# Patient Record
Sex: FEMALE | Race: WHITE | Employment: FULL TIME | URBAN - METROPOLITAN AREA
[De-identification: names, ages, dates, MRNs, and addresses within clinical notes are randomized per-mention and may not be internally consistent; named-entity substitution may affect disease eponyms.]

---

## 2017-12-15 VITALS — BODY MASS INDEX: 28.68 KG/M2 | WEIGHT: 168 LBS | HEIGHT: 64 IN

## 2017-12-15 NOTE — PERIOP NOTES
Patient verified name, , and surgery as listed in [facility]. Type 2 surgery, phone assessment complete. Orders not received. Labs per surgeon: no orders on chart  Labs per anesthesia protocol: hgb needed- Pt instructed to go to outpatient lab at Entrance B for blood draw Mon-Fri [id number] prior to day of surgery. 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 Order placed in EMR on hold for arrival to preop. Patient answered medical/surgical history questions at their best of ability. All prior to admission medications documented in [facility]. Patient instructed to take the following medications the day of surgery according to anesthesia guidelines with a small sip of water: omeprazole. Hold all vitamins 7 days prior to surgery and NSAIDS 5 days prior to surgery. Medications to be held- none. Patient instructed on the following:  Arrive at A Entrance, time of arrival to be called the day before by 1700  NPO after midnight including gum, mints, and ice chips  Responsible adult must drive patient to the hospital, stay during surgery, and patient will need supervision 24 hours after anesthesia  Use antibacterial soap in shower the night before surgery and on the morning of surgery  Leave all valuables (money and jewelry) at home but bring insurance card and ID on DOS  Do not wear make-up, nail polish, lotions, cologne, perfumes, powders, or oil on skin. Patient teach back successful and patient demonstrates knowledge of instruction.

## 2017-12-18 ENCOUNTER — HOSPITAL ENCOUNTER (OUTPATIENT)
Dept: SURGERY | Age: 37
Discharge: HOME | End: 2017-12-18

## 2017-12-21 ENCOUNTER — ANESTHESIA EVENT (OUTPATIENT)
Dept: SURGERY | Age: 37
End: 2017-12-21
Payer: COMMERCIAL

## 2017-12-21 RX ORDER — SODIUM CHLORIDE 0.9 % (FLUSH) 0.9 %
5-10 SYRINGE (ML) INJECTION AS NEEDED
Status: CANCELLED | OUTPATIENT
Start: 2017-12-21

## 2017-12-21 RX ORDER — SODIUM CHLORIDE 0.9 % (FLUSH) 0.9 %
5-10 SYRINGE (ML) INJECTION EVERY 8 HOURS
Status: CANCELLED | OUTPATIENT
Start: 2017-12-21

## 2017-12-21 NOTE — H&P
Subjective:     Patient is a 40 y.o.  female presents with pelvic pain and right ovarian cyst. gradually worsening course. Patient Active Problem List    Diagnosis Date Noted    Gastroesophageal reflux disease without esophagitis 04/23/2015    Hyperhidrosis of palms 10/11/2012     Past Medical History:   Diagnosis Date    Esophageal reflux     Nausea & vomiting     post-op    Ovarian cyst       Past Surgical History:   Procedure Laterality Date    HX APPENDECTOMY  2000    HX ORTHOPAEDIC Right May 2014    hand fracture - surgery after injury    HX OVARIAN CYST REMOVAL  2000    right      [unfilled]  Allergies   Allergen Reactions    Bactrim [Sulfamethoxazole-Trimethoprim] Rash    Cephalexin Other (comments)     Mouth sores    Codeine Nausea and Vomiting    Erythromycin Other (comments)     GI - Zithromax ok      Social History   Substance Use Topics    Smoking status: Never Smoker    Smokeless tobacco: Never Used    Alcohol use No      Family History   Problem Relation Age of Onset    No Known Problems Mother     No Known Problems Father     No Known Problems Brother       Review of Systems  A comprehensive review of systems was negative except for that written in the HPI. Objective:     No data found. No intake or output data in the 24 hours ending 12/21/17 1148      General: Alert . Oriented x3   HEENT: Normocephalic PEERL   Heart: RRR   Lungs: Clear   Abdominal: Bowel sounds are normal, liver is not enlarged, spleen is not enlarged   Neurological: Alert and oriented X 3, normal strength and tone. Normal symmetric reflexes.  Normal coordination and gait   Extremities: extremities normal, atraumatic, no cyanosis or edema     Assessment:     Patient Active Problem List    Diagnosis Date Noted    Gastroesophageal reflux disease without esophagitis 04/23/2015    Hyperhidrosis of palms 10/11/2012       Plan:       Procedure(s):  RIGHT SALPINGO-OOPHORECTOMY LAPAROSCOPIC        The procedure was reviewed in detail as well as the risks of bleeding, infection, DVT and potential surgical complications involving the bladder, ureters, colon or intestines. Also the alternatives,  benefits, recovery and follow-up. All of her questions were answered.

## 2017-12-22 ENCOUNTER — ANESTHESIA (OUTPATIENT)
Dept: SURGERY | Age: 37
End: 2017-12-22
Payer: COMMERCIAL

## 2017-12-22 ENCOUNTER — HOSPITAL ENCOUNTER (OUTPATIENT)
Age: 37
Setting detail: OUTPATIENT SURGERY
Discharge: HOME | End: 2017-12-22
Attending: OBSTETRICS & GYNECOLOGY | Admitting: OBSTETRICS & GYNECOLOGY
Payer: COMMERCIAL

## 2017-12-22 VITALS
WEIGHT: 165.44 LBS | RESPIRATION RATE: 15 BRPM | TEMPERATURE: 98.8 F | HEART RATE: 73 BPM | OXYGEN SATURATION: 97 % | SYSTOLIC BLOOD PRESSURE: 116 MMHG | HEIGHT: 64 IN | BODY MASS INDEX: 28.24 KG/M2 | DIASTOLIC BLOOD PRESSURE: 62 MMHG

## 2017-12-22 DIAGNOSIS — G89.18 POSTOPERATIVE PAIN: Primary | ICD-10-CM

## 2017-12-22 LAB
HCG UR QL: NEGATIVE
HGB BLD-MCNC: 11.1 G/DL (ref 11.7–15.4)

## 2017-12-22 PROCEDURE — 76010000161 HC OR TIME 1 TO 1.5 HR INTENSV-TIER 1: Performed by: OBSTETRICS & GYNECOLOGY

## 2017-12-22 PROCEDURE — 74011250637 HC RX REV CODE- 250/637: Performed by: OBSTETRICS & GYNECOLOGY

## 2017-12-22 PROCEDURE — 77030018836 HC SOL IRR NACL ICUM -A: Performed by: OBSTETRICS & GYNECOLOGY

## 2017-12-22 PROCEDURE — 77030008477 HC STYL SATN SLP COVD -A: Performed by: ANESTHESIOLOGY

## 2017-12-22 PROCEDURE — 77030008756 HC TU IRR SUC STRY -B: Performed by: OBSTETRICS & GYNECOLOGY

## 2017-12-22 PROCEDURE — 74011000250 HC RX REV CODE- 250

## 2017-12-22 PROCEDURE — 77030008703 HC TU ET UNCUF COVD -A: Performed by: ANESTHESIOLOGY

## 2017-12-22 PROCEDURE — 74011000250 HC RX REV CODE- 250: Performed by: OBSTETRICS & GYNECOLOGY

## 2017-12-22 PROCEDURE — 77030008522 HC TBNG INSUF LAPRO STRY -B: Performed by: OBSTETRICS & GYNECOLOGY

## 2017-12-22 PROCEDURE — 76210000020 HC REC RM PH II FIRST 0.5 HR: Performed by: OBSTETRICS & GYNECOLOGY

## 2017-12-22 PROCEDURE — 77030000038 HC TIP SCIS LAPSCP SURI -B: Performed by: OBSTETRICS & GYNECOLOGY

## 2017-12-22 PROCEDURE — 77030020782 HC GWN BAIR PAWS FLX 3M -B: Performed by: ANESTHESIOLOGY

## 2017-12-22 PROCEDURE — 74011000250 HC RX REV CODE- 250: Performed by: ANESTHESIOLOGY

## 2017-12-22 PROCEDURE — 74011250636 HC RX REV CODE- 250/636

## 2017-12-22 PROCEDURE — 77030037892: Performed by: OBSTETRICS & GYNECOLOGY

## 2017-12-22 PROCEDURE — 85018 HEMOGLOBIN: CPT | Performed by: ANESTHESIOLOGY

## 2017-12-22 PROCEDURE — 76210000016 HC OR PH I REC 1 TO 1.5 HR: Performed by: OBSTETRICS & GYNECOLOGY

## 2017-12-22 PROCEDURE — 77030010507 HC ADH SKN DERMBND J&J -B: Performed by: OBSTETRICS & GYNECOLOGY

## 2017-12-22 PROCEDURE — 77030032490 HC SLV COMPR SCD KNE COVD -B: Performed by: OBSTETRICS & GYNECOLOGY

## 2017-12-22 PROCEDURE — 81025 URINE PREGNANCY TEST: CPT

## 2017-12-22 PROCEDURE — 77030011640 HC PAD GRND REM COVD -A: Performed by: OBSTETRICS & GYNECOLOGY

## 2017-12-22 PROCEDURE — 77030031139 HC SUT VCRL2 J&J -A: Performed by: OBSTETRICS & GYNECOLOGY

## 2017-12-22 PROCEDURE — 74011250636 HC RX REV CODE- 250/636: Performed by: ANESTHESIOLOGY

## 2017-12-22 PROCEDURE — 77030035029 HC NDL INSUF VERES DISP COVD -B: Performed by: OBSTETRICS & GYNECOLOGY

## 2017-12-22 PROCEDURE — 77030026093 HC FCPS ENDOSC PLSM OCOA -E: Performed by: OBSTETRICS & GYNECOLOGY

## 2017-12-22 PROCEDURE — 88305 TISSUE EXAM BY PATHOLOGIST: CPT | Performed by: OBSTETRICS & GYNECOLOGY

## 2017-12-22 PROCEDURE — 77030035045 HC TRCR ENDOSC VRSPRT BLDLSS COVD -B: Performed by: OBSTETRICS & GYNECOLOGY

## 2017-12-22 PROCEDURE — 77030035051: Performed by: OBSTETRICS & GYNECOLOGY

## 2017-12-22 PROCEDURE — 76060000033 HC ANESTHESIA 1 TO 1.5 HR: Performed by: OBSTETRICS & GYNECOLOGY

## 2017-12-22 PROCEDURE — 77030035048 HC TRCR ENDOSC OPTCL COVD -B: Performed by: OBSTETRICS & GYNECOLOGY

## 2017-12-22 PROCEDURE — 77030011502 HC MANIP UTER ZUM ZINN -B: Performed by: OBSTETRICS & GYNECOLOGY

## 2017-12-22 RX ORDER — SODIUM CHLORIDE, SODIUM LACTATE, POTASSIUM CHLORIDE, CALCIUM CHLORIDE 600; 310; 30; 20 MG/100ML; MG/100ML; MG/100ML; MG/100ML
50 INJECTION, SOLUTION INTRAVENOUS CONTINUOUS
Status: DISCONTINUED | OUTPATIENT
Start: 2017-12-22 | End: 2017-12-22 | Stop reason: HOSPADM

## 2017-12-22 RX ORDER — OXYCODONE HYDROCHLORIDE 5 MG/1
5 TABLET ORAL
Status: DISCONTINUED | OUTPATIENT
Start: 2017-12-22 | End: 2017-12-22 | Stop reason: HOSPADM

## 2017-12-22 RX ORDER — LIDOCAINE HYDROCHLORIDE 20 MG/ML
INJECTION, SOLUTION EPIDURAL; INFILTRATION; INTRACAUDAL; PERINEURAL AS NEEDED
Status: DISCONTINUED | OUTPATIENT
Start: 2017-12-22 | End: 2017-12-22 | Stop reason: HOSPADM

## 2017-12-22 RX ORDER — CELECOXIB 200 MG/1
200 CAPSULE ORAL
Status: DISCONTINUED | OUTPATIENT
Start: 2017-12-22 | End: 2017-12-22 | Stop reason: HOSPADM

## 2017-12-22 RX ORDER — SODIUM CHLORIDE, SODIUM LACTATE, POTASSIUM CHLORIDE, CALCIUM CHLORIDE 600; 310; 30; 20 MG/100ML; MG/100ML; MG/100ML; MG/100ML
75 INJECTION, SOLUTION INTRAVENOUS CONTINUOUS
Status: DISCONTINUED | OUTPATIENT
Start: 2017-12-22 | End: 2017-12-22 | Stop reason: HOSPADM

## 2017-12-22 RX ORDER — MIDAZOLAM HYDROCHLORIDE 1 MG/ML
2 INJECTION, SOLUTION INTRAMUSCULAR; INTRAVENOUS ONCE
Status: DISCONTINUED | OUTPATIENT
Start: 2017-12-22 | End: 2017-12-22 | Stop reason: HOSPADM

## 2017-12-22 RX ORDER — ONDANSETRON 2 MG/ML
INJECTION INTRAMUSCULAR; INTRAVENOUS AS NEEDED
Status: DISCONTINUED | OUTPATIENT
Start: 2017-12-22 | End: 2017-12-22 | Stop reason: HOSPADM

## 2017-12-22 RX ORDER — GLYCOPYRROLATE 0.2 MG/ML
INJECTION INTRAMUSCULAR; INTRAVENOUS AS NEEDED
Status: DISCONTINUED | OUTPATIENT
Start: 2017-12-22 | End: 2017-12-22 | Stop reason: HOSPADM

## 2017-12-22 RX ORDER — DEXAMETHASONE SODIUM PHOSPHATE 4 MG/ML
INJECTION, SOLUTION INTRA-ARTICULAR; INTRALESIONAL; INTRAMUSCULAR; INTRAVENOUS; SOFT TISSUE AS NEEDED
Status: DISCONTINUED | OUTPATIENT
Start: 2017-12-22 | End: 2017-12-22 | Stop reason: HOSPADM

## 2017-12-22 RX ORDER — FENTANYL CITRATE 50 UG/ML
100 INJECTION, SOLUTION INTRAMUSCULAR; INTRAVENOUS ONCE
Status: DISCONTINUED | OUTPATIENT
Start: 2017-12-22 | End: 2017-12-22 | Stop reason: HOSPADM

## 2017-12-22 RX ORDER — BUPIVACAINE HYDROCHLORIDE 5 MG/ML
INJECTION, SOLUTION EPIDURAL; INTRACAUDAL AS NEEDED
Status: DISCONTINUED | OUTPATIENT
Start: 2017-12-22 | End: 2017-12-22 | Stop reason: HOSPADM

## 2017-12-22 RX ORDER — KETOROLAC TROMETHAMINE 30 MG/ML
INJECTION, SOLUTION INTRAMUSCULAR; INTRAVENOUS AS NEEDED
Status: DISCONTINUED | OUTPATIENT
Start: 2017-12-22 | End: 2017-12-22 | Stop reason: HOSPADM

## 2017-12-22 RX ORDER — OXYCODONE HYDROCHLORIDE 5 MG/1
5 TABLET ORAL
Qty: 30 TAB | Refills: 0 | Status: SHIPPED | OUTPATIENT
Start: 2017-12-22 | End: 2017-12-29

## 2017-12-22 RX ORDER — MIDAZOLAM HYDROCHLORIDE 1 MG/ML
2 INJECTION, SOLUTION INTRAMUSCULAR; INTRAVENOUS
Status: DISCONTINUED | OUTPATIENT
Start: 2017-12-22 | End: 2017-12-22 | Stop reason: HOSPADM

## 2017-12-22 RX ORDER — HYDROMORPHONE HYDROCHLORIDE 2 MG/ML
0.5 INJECTION, SOLUTION INTRAMUSCULAR; INTRAVENOUS; SUBCUTANEOUS
Status: DISCONTINUED | OUTPATIENT
Start: 2017-12-22 | End: 2017-12-22 | Stop reason: HOSPADM

## 2017-12-22 RX ORDER — PROPOFOL 10 MG/ML
INJECTION, EMULSION INTRAVENOUS AS NEEDED
Status: DISCONTINUED | OUTPATIENT
Start: 2017-12-22 | End: 2017-12-22 | Stop reason: HOSPADM

## 2017-12-22 RX ORDER — LIDOCAINE HYDROCHLORIDE 10 MG/ML
0.1 INJECTION INFILTRATION; PERINEURAL AS NEEDED
Status: DISCONTINUED | OUTPATIENT
Start: 2017-12-22 | End: 2017-12-22 | Stop reason: HOSPADM

## 2017-12-22 RX ORDER — SODIUM CHLORIDE 0.9 % (FLUSH) 0.9 %
5-10 SYRINGE (ML) INJECTION AS NEEDED
Status: DISCONTINUED | OUTPATIENT
Start: 2017-12-22 | End: 2017-12-22 | Stop reason: HOSPADM

## 2017-12-22 RX ORDER — SCOLOPAMINE TRANSDERMAL SYSTEM 1 MG/1
1.5 PATCH, EXTENDED RELEASE TRANSDERMAL
Status: DISCONTINUED | OUTPATIENT
Start: 2017-12-22 | End: 2017-12-22 | Stop reason: HOSPADM

## 2017-12-22 RX ORDER — ROCURONIUM BROMIDE 10 MG/ML
INJECTION, SOLUTION INTRAVENOUS AS NEEDED
Status: DISCONTINUED | OUTPATIENT
Start: 2017-12-22 | End: 2017-12-22 | Stop reason: HOSPADM

## 2017-12-22 RX ORDER — ALBUTEROL SULFATE 0.83 MG/ML
2.5 SOLUTION RESPIRATORY (INHALATION) AS NEEDED
Status: DISCONTINUED | OUTPATIENT
Start: 2017-12-22 | End: 2017-12-22 | Stop reason: HOSPADM

## 2017-12-22 RX ORDER — FENTANYL CITRATE 50 UG/ML
INJECTION, SOLUTION INTRAMUSCULAR; INTRAVENOUS AS NEEDED
Status: DISCONTINUED | OUTPATIENT
Start: 2017-12-22 | End: 2017-12-22 | Stop reason: HOSPADM

## 2017-12-22 RX ORDER — NEOSTIGMINE METHYLSULFATE 1 MG/ML
INJECTION INTRAVENOUS AS NEEDED
Status: DISCONTINUED | OUTPATIENT
Start: 2017-12-22 | End: 2017-12-22 | Stop reason: HOSPADM

## 2017-12-22 RX ADMIN — LIDOCAINE HYDROCHLORIDE 60 MG: 20 INJECTION, SOLUTION EPIDURAL; INFILTRATION; INTRACAUDAL; PERINEURAL at 09:50

## 2017-12-22 RX ADMIN — SODIUM CHLORIDE, SODIUM LACTATE, POTASSIUM CHLORIDE, AND CALCIUM CHLORIDE: 600; 310; 30; 20 INJECTION, SOLUTION INTRAVENOUS at 09:37

## 2017-12-22 RX ADMIN — FENTANYL CITRATE 50 MCG: 50 INJECTION, SOLUTION INTRAMUSCULAR; INTRAVENOUS at 11:03

## 2017-12-22 RX ADMIN — FENTANYL CITRATE 50 MCG: 50 INJECTION, SOLUTION INTRAMUSCULAR; INTRAVENOUS at 09:50

## 2017-12-22 RX ADMIN — PROPOFOL 150 MG: 10 INJECTION, EMULSION INTRAVENOUS at 09:50

## 2017-12-22 RX ADMIN — ONDANSETRON 4 MG: 2 INJECTION INTRAMUSCULAR; INTRAVENOUS at 10:40

## 2017-12-22 RX ADMIN — GLYCOPYRROLATE 0.4 MG: 0.2 INJECTION INTRAMUSCULAR; INTRAVENOUS at 10:50

## 2017-12-22 RX ADMIN — KETOROLAC TROMETHAMINE 30 MG: 30 INJECTION, SOLUTION INTRAMUSCULAR; INTRAVENOUS at 10:47

## 2017-12-22 RX ADMIN — LIDOCAINE HYDROCHLORIDE 0.1 ML: 10 INJECTION, SOLUTION INFILTRATION; PERINEURAL at 09:26

## 2017-12-22 RX ADMIN — MIDAZOLAM HYDROCHLORIDE 2 MG: 1 INJECTION, SOLUTION INTRAMUSCULAR; INTRAVENOUS at 09:33

## 2017-12-22 RX ADMIN — SODIUM CHLORIDE, SODIUM LACTATE, POTASSIUM CHLORIDE, AND CALCIUM CHLORIDE 75 ML/HR: 600; 310; 30; 20 INJECTION, SOLUTION INTRAVENOUS at 10:00

## 2017-12-22 RX ADMIN — HYDROMORPHONE HYDROCHLORIDE 0.5 MG: 2 INJECTION, SOLUTION INTRAMUSCULAR; INTRAVENOUS; SUBCUTANEOUS at 11:14

## 2017-12-22 RX ADMIN — NEOSTIGMINE METHYLSULFATE 3 MG: 1 INJECTION INTRAVENOUS at 10:50

## 2017-12-22 RX ADMIN — DEXAMETHASONE SODIUM PHOSPHATE 10 MG: 4 INJECTION, SOLUTION INTRA-ARTICULAR; INTRALESIONAL; INTRAMUSCULAR; INTRAVENOUS; SOFT TISSUE at 10:40

## 2017-12-22 RX ADMIN — ROCURONIUM BROMIDE 40 MG: 10 INJECTION, SOLUTION INTRAVENOUS at 09:51

## 2017-12-22 RX ADMIN — HYDROMORPHONE HYDROCHLORIDE 0.5 MG: 2 INJECTION, SOLUTION INTRAMUSCULAR; INTRAVENOUS; SUBCUTANEOUS at 11:20

## 2017-12-22 NOTE — BRIEF OP NOTE
BRIEF OPERATIVE NOTE    Date of Procedure: 12/22/2017   Preoperative Diagnosis: Pelvic pain in female [R10.2]  Cyst of ovary, unspecified laterality [N83.209]  Postoperative Diagnosis: Pelvic pain in female [R10.2]  Cyst of ovary, unspecified     Procedure(s):  RIGHT SALPINGO-OOPHORECTOMY LAPAROSCOPIC, LYSIS OF ADHESIONS, LEFT OVARIAN CYSTECTOMY  Surgeon(s) and Role:     * Radha Bonilla MD - Primary         Assistant Staff:       Surgical Staff:  Circ-1: Frankie Burgos RN  Scrub Tech-1: Rakel Oconnell  Scrub Tech-2: Danielle Jones  Event Time In   Incision Start 1015   Incision Close 1051     Anesthesia: General   Estimated Blood Loss: 25cc  Specimens:   ID Type Source Tests Collected by Time Destination   1 : RIGHT TUBE AND OVARY Preservative   Radha Bonilla MD 12/22/2017 1048 Pathology      Findings: left ovarian cyst, right adhesions and cyst, adhesions posterior uterus (dense).  endometriosis   Complications: none  Implants: * No implants in log *

## 2017-12-22 NOTE — ANESTHESIA PREPROCEDURE EVALUATION
Anesthetic History     PONV          Review of Systems / Medical History  Patient summary reviewed, nursing notes reviewed and pertinent labs reviewed    Pulmonary  Within defined limits                 Neuro/Psych   Within defined limits           Cardiovascular                  Exercise tolerance: >4 METS     GI/Hepatic/Renal     GERD: well controlled           Endo/Other  Within defined limits           Other Findings            Physical Exam    Airway  Mallampati: II      Mouth opening: Normal     Cardiovascular  Regular rate and rhythm,  S1 and S2 normal,  no murmur, click, rub, or gallop             Dental  No notable dental hx       Pulmonary  Breath sounds clear to auscultation               Abdominal         Other Findings            Anesthetic Plan    ASA: 2  Anesthesia type: general          Induction: Intravenous  Anesthetic plan and risks discussed with: Patient and Mother

## 2017-12-22 NOTE — ANESTHESIA POSTPROCEDURE EVALUATION
Post-Anesthesia Evaluation and Assessment    Patient: Darlene Noe MRN: [id number]  SSN: [id number]    YOB: 1980  Age: 40 y.o. Sex: female       Cardiovascular Function/Vital Signs  Visit Vitals    /69    Pulse (!) 56    Temp 37.1 °C (98.8 °F)    Resp 15    Ht 5' 4\" (1.626 m)    Wt 75 kg (165 lb 7 oz)    SpO2 96%    BMI 28.4 kg/m2       Patient is status post general anesthesia for Procedure(s):  RIGHT SALPINGO-OOPHORECTOMY LAPAROSCOPIC, LYSIS OF ADHESIONS, LEFT OVARIAN CYSTECTOMY. Nausea/Vomiting: None    Postoperative hydration reviewed and adequate. Pain:  Pain Scale 1: Numeric (0 - 10) (12/22/17 1115)  Pain Intensity 1: 6 (12/22/17 1115)   Managed    Neurological Status:   Neuro (WDL): Within Defined Limits (12/22/17 1105)  Neuro  Neurologic State: Drowsy (12/22/17 1115)  Orientation Level: Oriented to person;Oriented to situation (12/22/17 1115)  Speech: Clear (12/22/17 1115)  LUE Motor Response: Purposeful (12/22/17 1115)  LLE Motor Response: Purposeful (12/22/17 1115)  RUE Motor Response: Purposeful (12/22/17 1115)  RLE Motor Response: Purposeful (12/22/17 1115)   At baseline    Mental Status and Level of Consciousness: Arousable    Pulmonary Status:   O2 Device: Room air (12/22/17 1150)   Adequate oxygenation and airway patent    Complications related to anesthesia: None    Post-anesthesia assessment completed.  No concerns    Signed By: Alex Corcoran MD     December 22, 2017

## 2017-12-22 NOTE — DISCHARGE INSTRUCTIONS
INSTRUCTIONS FOLLOWING GYN LAPAROSCOPY    ACTIVITY   Limit activity today; increase activity tomorrow, but no vigorous exercise   Shower only; no tub baths   No douches, tampons or intercourse until your doctor releases you (at least 2 weeks)   May return to work or school as directed by your doctor    DIET   Clear liquids until no nausea or vomiting   Advance to regular diet as tolerated    PAIN   Expect a moderate amount of pain.  Take pain medication as directed by your doctor. If no prescription for pain is sent home with you, take the appropriate dose of your commonly used pain medication.  Call you doctor if pain is NOT relieved by medication.  DO NOT take aspirin or blood thinners until directed by your doctor. DRESSING CARE   Change dressing / band aids as directed by your doctor. FOLLOW PHONE [facility] will be made by nursing staff.  If you have any problems or concerns, call your doctor as needed. CALL YOUR DOCTOR IF   Excessive bleeding that does not stop after holding mild pressure over the area for 15 minutes   You soak a pad in an hour   Temperature of 101°F or above   Green or yellow, smelly drainage or discharge   You are unable to urinate by bedtime   Nausea and vomiting that does not stop by bedtime    AFTER ANESTHESIA   For the next 24 hours: DO NOT Drive, Drink alcoholic beverages, or Make important decisions.  Be aware of dizziness following anesthesia and while taking pain medication.    Plan to stay tonight within 1 hours drive of the hospital.

## 2017-12-22 NOTE — IP AVS SNAPSHOT
[address] [address] 
[phone] Patient: Opal Saleh MRN: [id number] JXA:[birth year] About your hospitalization You were admitted on:  December 22, 2017 You last received care in the:  [facility] PACU You were discharged on:  December 22, 2017 Why you were hospitalized Your primary diagnosis was:  Not on File Things You Need To Do (next 8 weeks) Follow up with Toshia Uribe MD  
  
Phone:  [phone] Where:  [address], [address] [address] Follow up with Yaron Eason MD  
  
Phone:  [phone] Where:  [facility] Friday Dec 29, 2017 POST OP with Toshia Uribe MD at  9:00 AM  
Where:  [facility] ([address] [place] [address]) Discharge Orders None A check michael indicates which time of day the medication should be taken. My Medications TAKE these medications as instructed Instructions Each Dose to Equal  
 Morning Noon Evening Bedtime  
 oxyCODONE IR 5 mg immediate release tablet Commonly known as:  Neil Cardona Your last dose was: Your next dose is: Take 1 Tab by mouth every four (4) hours as needed. Max Daily Amount: 30 mg.  
 5 mg PriLOSEC OTC 20 mg tablet Generic drug:  omeprazole Your last dose was: Your next dose is: Take 20 mg by mouth daily. 20 mg Where to Get Your Medications Information on where to get these meds will be given to you by the nurse or doctor. ! Ask your nurse or doctor about these medications  
  oxyCODONE IR 5 mg immediate release tablet Discharge Instructions INSTRUCTIONS FOLLOWING GYN LAPAROSCOPY 
 
ACTIVITY  Limit activity today; increase activity tomorrow, but no vigorous exercise  Shower only; no tub baths  No douches, tampons or intercourse until your doctor releases you (at least 2 weeks)  May return to work or school as directed by your doctor DIET  Clear liquids until no nausea or vomiting  Advance to regular diet as tolerated PAIN 
 Expect a moderate amount of pain.  Take pain medication as directed by your doctor. If no prescription for pain is sent home with you, take the appropriate dose of your commonly used pain medication.  Call you doctor if pain is NOT relieved by medication.  DO NOT take aspirin or blood thinners until directed by your doctor. DRESSING CARE  Change dressing / band aids as directed by your doctor. FOLLOW PHONE CALLS * Calls will be made by nursing staff.  If you have any problems or concerns, call your doctor as needed. CALL YOUR DOCTOR IF 
 Excessive bleeding that does not stop after holding mild pressure over the area for 15 minutes  You soak a pad in an hour  Temperature of 101°F or above  Green or yellow, smelly drainage or discharge  You are unable to urinate by bedtime  Nausea and vomiting that does not stop by bedtime AFTER ANESTHESIA  For the next 24 hours: DO NOT Drive, Drink alcoholic beverages, or Make important decisions.  Be aware of dizziness following anesthesia and while taking pain medication.  Plan to stay tonight within 1 hours drive of the hospital. 
 
 
 
  
  
  
Introducing [facility] HE[facility]! Dear Shahrzad Mcgarry: 
Thank you for requesting a EasyRun account. Our records indicate that you already have an active EasyRun account. You can access your account anytime at [email or web address] Did you know that you can access your hospital and ER discharge instructions at any time in EasyRun? You can also review all of your test results from your hospital stay or ER visit. Additional Information If you have questions, please visit the Frequently Asked Questions section of the [employer] website at [email or web address] Remember, [employer] is NOT to be used for urgent needs. For medical emergencies, dial 911. Now available from your iPhone and Android! Providers Seen During Your Hospitalization Provider Specialty Primary office phone Lali Charlton MD Obstetrics & Gynecology [phone] Your Primary Care Physician (PCP) Primary Care Physician Office Phone Office Fax Mae Sanchez [phone] You are allergic to the following Allergen Reactions Bactrim (Sulfamethoxazole-Trimethoprim) Rash Cephalexin Other (comments) Mouth sores Codeine Nausea and Vomiting Erythromycin Other (comments) GI - Zithromax ok Recent Documentation Height Weight BMI Smoking Status 1.626 m 75 kg 28.4 kg/m2 Never Smoker Emergency Contacts Name Discharge Info Relation Home Work Mobile Maxi Cotsa  Parent [1] [phone] [address]  Parent [1] [phone] Patient Belongings The following personal items are in your possession at time of discharge: 
  Dental Appliances: None  Visual Aid: Glasses Please provide this summary of care documentation to your next provider. Signatures-by signing, you are acknowledging that this After Visit Summary has been reviewed with you and you have received a copy. Patient Signature:  ____________________________________________________________ Date:  ____________________________________________________________  
  
Kenneth Alcazar Provider Signature:  ____________________________________________________________ Date:  ____________________________________________________________

## 2017-12-29 NOTE — OP NOTES
[address] REPORT    Michelle Serrano  MR#: [id number]  : 1980  ACCOUNT #: [de-identified]   DATE OF SERVICE: 2017    PREOPERATIVE DIAGNOSES  1.  Pelvic pain. 2.  Right ovarian cysts. POSTOPERATIVE DIAGNOSES  1.  Pelvic pain. 2.  Right ovarian cysts. 3.  Pelvic adhesions. 4.  Left ovarian cysts. PROCEDURES PERFORMED:  Right salpingo-oophorectomy laparoscopically, adhesiolysis, enterolysis and left ovarian cystectomy. SURGEON:  Vinod Yost MD    ANESTHESIA:  General.    COMPLICATIONS:  None. ESTIMATED BLOOD LOSS:  50 mL. SPECIMENS:  ___    FINDINGS:  Left ovarian cyst, right adhesions and ovarian cyst, posterior adhesions, powder burn areas consistent with possible endometriosis. DESCRIPTION OF PROCEDURE:  After an adequate level of anesthesia was obtained, the patient was prepped and draped in the usual sterile fashion in the dorsal lithotomy position. A weighted speculum was placed. Anterior aspect of the cervix was grasped with a tenaculum. HUMI catheter inserted. Gloves were changed. Infraumbilical incision was made. Veress needle inserted, insufflating the peritoneal cavity with CO2. A trocar was placed and noted to be within the peritoneal cavity. The right ovary was enlarged with no obvious cyst initially that was noted; after cutting into the cervix, there were noted to be cysts consistent with a teratoma, as seen on the ultrasound. The infundibulum and the uteroovarian ligament were coagulated and cut. The tube was left intact. The ovary was taken in toto. Adhesions were taken down from the posterior cul-de-sac with powder burn consistent with possible endometriosis and some adhesions. There were left ovarian cysts x2. These were noted to be clear fluid, drained and coagulated. More adhesions were taken down in the posterior cul-de-sac area; the area was irrigated free of blood clot and debris.   Hemostasis was noted at all points. The ovary was then removed in an EndoCatch bag. The 5 mm suprapubic trocar was removed. CO2 was released. Infraumbilical trocar was removed. Deep stitch placed infraumbilically. Dermabond on skin. Patient tolerated the procedure well and went to recovery room in good condition. DISPOSITION:  Patient was given the usual postoperative precautions; she was given a prescription for Percocet #20 and to be seen in the office for a followup visit.       MD KING Rizo / ROYCE  D: 12/29/2017 11:35     T: 12/29/2017 15:24  JOB #: [id number]

## 2018-10-05 ENCOUNTER — HOSPITAL ENCOUNTER (OUTPATIENT)
Dept: SURGERY | Age: 38
Discharge: HOME | End: 2018-10-05

## 2018-10-08 VITALS — BODY MASS INDEX: 28.17 KG/M2 | HEIGHT: 64 IN | WEIGHT: 165 LBS

## 2018-10-08 NOTE — PERIOP NOTES
Patient verified name, , and surgery as listed in [facility]. Type 2 surgery    Labs per surgeon: unknown; orders not received. Labs per anesthesia protocol: HGB  EKG: not needed per protocols. Patient informed of GYN class on 10/12/18 at which time labs will be drawn. Patient will also receive all patient education and hibiclens soap to use per hospital policy. Patient instructed to hold all vitamins 7 days prior to surgery and NSAIDS 5 days prior to surgery, patient verbalized understanding. Patient instructed to continue previous medications as prescribed prior to surgery and to take the following medications the day of surgery according to anesthesia guidelines with a small sip of water: Omeprazole. Patient answered medical/surgical history questions at their best of ability. All prior to admission medications documented in [facility].

## 2018-10-12 ENCOUNTER — HOSPITAL ENCOUNTER (OUTPATIENT)
Dept: SURGERY | Age: 38
Discharge: HOME | End: 2018-10-12
Payer: COMMERCIAL

## 2018-10-12 LAB — HGB BLD-MCNC: 11.8 G/DL (ref 11.7–15.4)

## 2018-10-12 PROCEDURE — 36415 COLL VENOUS BLD VENIPUNCTURE: CPT

## 2018-10-12 PROCEDURE — 85018 HEMOGLOBIN: CPT

## 2018-10-12 NOTE — PERIOP NOTES
Lab results within limits per anesthesia protocol; OK for surgery.      Recent Results (from the past 12 hour(s))   HEMOGLOBIN    Collection Time: 10/12/18 11:30 AM   Result Value Ref Range    HGB 11.8 11.7 - 15.4 g/dL

## 2018-10-12 NOTE — PROGRESS NOTES
Patient attended GYN surgery orientation class today. Detailed instruction book regarding GYN surgery was provided at start of class. Class content included pre-operative instructions for surgery in the week prior to and day before surgery. Packet including Hibiclens and instructions on bathing was provided to patient. Detailed information was given regarding arriving at the hospital and instructions for the patient's day of surgery. Discussed recovery from surgery, hospital stay, pain management, and discharge. Reviewed recovery at home including pelvic rest, driving and activity restrictions, issues requiring call to physician etc. Caroline Shearing all questions in detail. Patient voices understanding of all.

## 2018-10-29 ENCOUNTER — ANESTHESIA EVENT (OUTPATIENT)
Dept: SURGERY | Age: 38
End: 2018-10-29
Payer: COMMERCIAL

## 2018-10-30 ENCOUNTER — HOSPITAL ENCOUNTER (OUTPATIENT)
Age: 38
Setting detail: OBSERVATION
Discharge: HOME | End: 2018-10-31
Attending: OBSTETRICS & GYNECOLOGY | Admitting: OBSTETRICS & GYNECOLOGY
Payer: COMMERCIAL

## 2018-10-30 ENCOUNTER — ANESTHESIA (OUTPATIENT)
Dept: SURGERY | Age: 38
End: 2018-10-30
Payer: COMMERCIAL

## 2018-10-30 DIAGNOSIS — R10.2 PELVIC PAIN: Primary | ICD-10-CM

## 2018-10-30 LAB
ABO + RH BLD: NORMAL
BLOOD GROUP ANTIBODIES SERPL: NORMAL
HCG UR QL: NEGATIVE
SPECIMEN EXP DATE BLD: NORMAL

## 2018-10-30 PROCEDURE — 74011250637 HC RX REV CODE- 250/637

## 2018-10-30 PROCEDURE — 77030020703 HC SEAL CANN DISP INTU -B: Performed by: OBSTETRICS & GYNECOLOGY

## 2018-10-30 PROCEDURE — 77030039425 HC BLD LARYNG TRULITE DISP TELE -A: Performed by: ANESTHESIOLOGY

## 2018-10-30 PROCEDURE — 74011250636 HC RX REV CODE- 250/636

## 2018-10-30 PROCEDURE — 77030010517 HC APPL SEAL FLOSEL BAXT -B: Performed by: OBSTETRICS & GYNECOLOGY

## 2018-10-30 PROCEDURE — 88307 TISSUE EXAM BY PATHOLOGIST: CPT

## 2018-10-30 PROCEDURE — 74011000258 HC RX REV CODE- 258: Performed by: OBSTETRICS & GYNECOLOGY

## 2018-10-30 PROCEDURE — 99218 HC RM OBSERVATION: CPT

## 2018-10-30 PROCEDURE — 77030018778 HC MANIP UTER VCAR CNMD -B: Performed by: OBSTETRICS & GYNECOLOGY

## 2018-10-30 PROCEDURE — 77030039266 HC ADH SKN EXOFIN S2SG -A: Performed by: OBSTETRICS & GYNECOLOGY

## 2018-10-30 PROCEDURE — 77030018846 HC SOL IRR STRL H20 ICUM -A: Performed by: OBSTETRICS & GYNECOLOGY

## 2018-10-30 PROCEDURE — 77030010545: Performed by: OBSTETRICS & GYNECOLOGY

## 2018-10-30 PROCEDURE — 81025 URINE PREGNANCY TEST: CPT

## 2018-10-30 PROCEDURE — 77030035029 HC NDL INSUF VERES DISP COVD -B: Performed by: OBSTETRICS & GYNECOLOGY

## 2018-10-30 PROCEDURE — 77030008703 HC TU ET UNCUF COVD -A: Performed by: ANESTHESIOLOGY

## 2018-10-30 PROCEDURE — 77030020782 HC GWN BAIR PAWS FLX 3M -B: Performed by: ANESTHESIOLOGY

## 2018-10-30 PROCEDURE — 74011250636 HC RX REV CODE- 250/636: Performed by: ANESTHESIOLOGY

## 2018-10-30 PROCEDURE — 77030035277 HC OBTRTR BLDELSS DISP INTU -B: Performed by: OBSTETRICS & GYNECOLOGY

## 2018-10-30 PROCEDURE — 74011250636 HC RX REV CODE- 250/636: Performed by: OBSTETRICS & GYNECOLOGY

## 2018-10-30 PROCEDURE — 77030020255 HC SOL INJ LR 1000ML BG: Performed by: OBSTETRICS & GYNECOLOGY

## 2018-10-30 PROCEDURE — 77030008477 HC STYL SATN SLP COVD -A: Performed by: ANESTHESIOLOGY

## 2018-10-30 PROCEDURE — 77030016151 HC PROTCTR LNS DFOG COVD -B: Performed by: OBSTETRICS & GYNECOLOGY

## 2018-10-30 PROCEDURE — 36415 COLL VENOUS BLD VENIPUNCTURE: CPT

## 2018-10-30 PROCEDURE — 76010000876 HC OR TIME 2 TO 2.5HR INTENSV - TIER 2: Performed by: OBSTETRICS & GYNECOLOGY

## 2018-10-30 PROCEDURE — 77030034696 HC CATH URETH FOL 2W BARD -A: Performed by: OBSTETRICS & GYNECOLOGY

## 2018-10-30 PROCEDURE — 86901 BLOOD TYPING SEROLOGIC RH(D): CPT

## 2018-10-30 PROCEDURE — 77030027744 HC PWDR HEMSTAT ARISTA ABSRB 5GM BARD -D: Performed by: OBSTETRICS & GYNECOLOGY

## 2018-10-30 PROCEDURE — 77030031492 HC PRT ACC BLNT AIRSEAL CNMD -B: Performed by: OBSTETRICS & GYNECOLOGY

## 2018-10-30 PROCEDURE — 76060000035 HC ANESTHESIA 2 TO 2.5 HR: Performed by: OBSTETRICS & GYNECOLOGY

## 2018-10-30 PROCEDURE — 77030034744 HC WRMR SCOPE DISP STRL ADLR -A: Performed by: OBSTETRICS & GYNECOLOGY

## 2018-10-30 PROCEDURE — 77030008771 HC TU NG SALEM SUMP -A: Performed by: ANESTHESIOLOGY

## 2018-10-30 PROCEDURE — 77030022704 HC SUT VLOC COVD -B: Performed by: OBSTETRICS & GYNECOLOGY

## 2018-10-30 PROCEDURE — 77030035044 HC TRCR ENDOSC VRSPRT BLDLSS COVD -C: Performed by: OBSTETRICS & GYNECOLOGY

## 2018-10-30 PROCEDURE — 77030008756 HC TU IRR SUC STRY -B: Performed by: OBSTETRICS & GYNECOLOGY

## 2018-10-30 PROCEDURE — 77030031139 HC SUT VCRL2 J&J -A: Performed by: OBSTETRICS & GYNECOLOGY

## 2018-10-30 PROCEDURE — 77030005537 HC CATH URETH BARD -A: Performed by: OBSTETRICS & GYNECOLOGY

## 2018-10-30 PROCEDURE — 77030032490 HC SLV COMPR SCD KNE COVD -B: Performed by: OBSTETRICS & GYNECOLOGY

## 2018-10-30 PROCEDURE — 74011250637 HC RX REV CODE- 250/637: Performed by: OBSTETRICS & GYNECOLOGY

## 2018-10-30 PROCEDURE — 74011000250 HC RX REV CODE- 250

## 2018-10-30 PROCEDURE — 76210000006 HC OR PH I REC 0.5 TO 1 HR: Performed by: OBSTETRICS & GYNECOLOGY

## 2018-10-30 PROCEDURE — 74011000250 HC RX REV CODE- 250: Performed by: OBSTETRICS & GYNECOLOGY

## 2018-10-30 PROCEDURE — 77030014650 HC SEAL MTRX FLOSEL BAXT -C: Performed by: OBSTETRICS & GYNECOLOGY

## 2018-10-30 PROCEDURE — 74011250637 HC RX REV CODE- 250/637: Performed by: ANESTHESIOLOGY

## 2018-10-30 PROCEDURE — 77030019908 HC STETH ESOPH SIMS -A: Performed by: ANESTHESIOLOGY

## 2018-10-30 RX ORDER — FENTANYL CITRATE 50 UG/ML
INJECTION, SOLUTION INTRAMUSCULAR; INTRAVENOUS AS NEEDED
Status: DISCONTINUED | OUTPATIENT
Start: 2018-10-30 | End: 2018-10-30 | Stop reason: HOSPADM

## 2018-10-30 RX ORDER — LORAZEPAM 1 MG/1
1 TABLET ORAL
Status: DISCONTINUED | OUTPATIENT
Start: 2018-10-30 | End: 2018-10-30 | Stop reason: SDUPTHER

## 2018-10-30 RX ORDER — OXYCODONE AND ACETAMINOPHEN 5; 325 MG/1; MG/1
1 TABLET ORAL
Status: DISCONTINUED | OUTPATIENT
Start: 2018-10-30 | End: 2018-10-30 | Stop reason: SDUPTHER

## 2018-10-30 RX ORDER — HYDROMORPHONE HYDROCHLORIDE 2 MG/ML
1 INJECTION, SOLUTION INTRAMUSCULAR; INTRAVENOUS; SUBCUTANEOUS
Status: DISCONTINUED | OUTPATIENT
Start: 2018-10-30 | End: 2018-10-31 | Stop reason: HOSPADM

## 2018-10-30 RX ORDER — DEXAMETHASONE SODIUM PHOSPHATE 4 MG/ML
INJECTION, SOLUTION INTRA-ARTICULAR; INTRALESIONAL; INTRAMUSCULAR; INTRAVENOUS; SOFT TISSUE AS NEEDED
Status: DISCONTINUED | OUTPATIENT
Start: 2018-10-30 | End: 2018-10-30 | Stop reason: HOSPADM

## 2018-10-30 RX ORDER — SODIUM CHLORIDE, SODIUM LACTATE, POTASSIUM CHLORIDE, CALCIUM CHLORIDE 600; 310; 30; 20 MG/100ML; MG/100ML; MG/100ML; MG/100ML
125 INJECTION, SOLUTION INTRAVENOUS CONTINUOUS
Status: DISCONTINUED | OUTPATIENT
Start: 2018-10-30 | End: 2018-10-30

## 2018-10-30 RX ORDER — SODIUM CHLORIDE 0.9 % (FLUSH) 0.9 %
5-10 SYRINGE (ML) INJECTION EVERY 8 HOURS
Status: DISCONTINUED | OUTPATIENT
Start: 2018-10-30 | End: 2018-10-30 | Stop reason: HOSPADM

## 2018-10-30 RX ORDER — HYDROMORPHONE HYDROCHLORIDE 2 MG/ML
0.5 INJECTION, SOLUTION INTRAMUSCULAR; INTRAVENOUS; SUBCUTANEOUS
Status: DISCONTINUED | OUTPATIENT
Start: 2018-10-30 | End: 2018-10-30

## 2018-10-30 RX ORDER — KETOROLAC TROMETHAMINE 30 MG/ML
30 INJECTION, SOLUTION INTRAMUSCULAR; INTRAVENOUS
Status: DISCONTINUED | OUTPATIENT
Start: 2018-10-30 | End: 2018-10-31 | Stop reason: HOSPADM

## 2018-10-30 RX ORDER — OXYCODONE AND ACETAMINOPHEN 5; 325 MG/1; MG/1
1 TABLET ORAL
Status: DISCONTINUED | OUTPATIENT
Start: 2018-10-30 | End: 2018-10-31 | Stop reason: HOSPADM

## 2018-10-30 RX ORDER — DOCUSATE SODIUM 100 MG/1
100 CAPSULE, LIQUID FILLED ORAL 2 TIMES DAILY
Status: DISCONTINUED | OUTPATIENT
Start: 2018-10-30 | End: 2018-10-31 | Stop reason: HOSPADM

## 2018-10-30 RX ORDER — ACETAMINOPHEN 10 MG/ML
INJECTION, SOLUTION INTRAVENOUS AS NEEDED
Status: DISCONTINUED | OUTPATIENT
Start: 2018-10-30 | End: 2018-10-30 | Stop reason: HOSPADM

## 2018-10-30 RX ORDER — CLINDAMYCIN PHOSPHATE 900 MG/50ML
900 INJECTION INTRAVENOUS
Status: COMPLETED | OUTPATIENT
Start: 2018-10-30 | End: 2018-10-30

## 2018-10-30 RX ORDER — LIDOCAINE HYDROCHLORIDE 10 MG/ML
0.1 INJECTION INFILTRATION; PERINEURAL AS NEEDED
Status: DISCONTINUED | OUTPATIENT
Start: 2018-10-30 | End: 2018-10-30 | Stop reason: HOSPADM

## 2018-10-30 RX ORDER — HYDROMORPHONE HYDROCHLORIDE 1 MG/ML
1 INJECTION, SOLUTION INTRAMUSCULAR; INTRAVENOUS; SUBCUTANEOUS
Status: DISCONTINUED | OUTPATIENT
Start: 2018-10-30 | End: 2018-10-30 | Stop reason: SDUPTHER

## 2018-10-30 RX ORDER — ROCURONIUM BROMIDE 10 MG/ML
INJECTION, SOLUTION INTRAVENOUS AS NEEDED
Status: DISCONTINUED | OUTPATIENT
Start: 2018-10-30 | End: 2018-10-30 | Stop reason: HOSPADM

## 2018-10-30 RX ORDER — SODIUM CHLORIDE 0.9 % (FLUSH) 0.9 %
5-10 SYRINGE (ML) INJECTION AS NEEDED
Status: DISCONTINUED | OUTPATIENT
Start: 2018-10-30 | End: 2018-10-31 | Stop reason: HOSPADM

## 2018-10-30 RX ORDER — LIDOCAINE HYDROCHLORIDE 20 MG/ML
INJECTION, SOLUTION EPIDURAL; INFILTRATION; INTRACAUDAL; PERINEURAL AS NEEDED
Status: DISCONTINUED | OUTPATIENT
Start: 2018-10-30 | End: 2018-10-30 | Stop reason: HOSPADM

## 2018-10-30 RX ORDER — DIPHENHYDRAMINE HYDROCHLORIDE 50 MG/ML
12.5 INJECTION, SOLUTION INTRAMUSCULAR; INTRAVENOUS
Status: DISCONTINUED | OUTPATIENT
Start: 2018-10-30 | End: 2018-10-30 | Stop reason: SDUPTHER

## 2018-10-30 RX ORDER — PROPOFOL 10 MG/ML
INJECTION, EMULSION INTRAVENOUS AS NEEDED
Status: DISCONTINUED | OUTPATIENT
Start: 2018-10-30 | End: 2018-10-30 | Stop reason: HOSPADM

## 2018-10-30 RX ORDER — LORAZEPAM 1 MG/1
1 TABLET ORAL
Status: DISCONTINUED | OUTPATIENT
Start: 2018-10-30 | End: 2018-10-31 | Stop reason: HOSPADM

## 2018-10-30 RX ORDER — ONDANSETRON 2 MG/ML
INJECTION INTRAMUSCULAR; INTRAVENOUS AS NEEDED
Status: DISCONTINUED | OUTPATIENT
Start: 2018-10-30 | End: 2018-10-30 | Stop reason: HOSPADM

## 2018-10-30 RX ORDER — MIDAZOLAM HYDROCHLORIDE 1 MG/ML
2 INJECTION, SOLUTION INTRAMUSCULAR; INTRAVENOUS
Status: COMPLETED | OUTPATIENT
Start: 2018-10-30 | End: 2018-10-30

## 2018-10-30 RX ORDER — SODIUM CHLORIDE 0.9 % (FLUSH) 0.9 %
5-10 SYRINGE (ML) INJECTION AS NEEDED
Status: DISCONTINUED | OUTPATIENT
Start: 2018-10-30 | End: 2018-10-30

## 2018-10-30 RX ORDER — SODIUM CHLORIDE, SODIUM LACTATE, POTASSIUM CHLORIDE, CALCIUM CHLORIDE 600; 310; 30; 20 MG/100ML; MG/100ML; MG/100ML; MG/100ML
125 INJECTION, SOLUTION INTRAVENOUS CONTINUOUS
Status: DISCONTINUED | OUTPATIENT
Start: 2018-10-30 | End: 2018-10-30 | Stop reason: HOSPADM

## 2018-10-30 RX ORDER — SODIUM CHLORIDE 0.9 % (FLUSH) 0.9 %
5-10 SYRINGE (ML) INJECTION EVERY 8 HOURS
Status: DISCONTINUED | OUTPATIENT
Start: 2018-10-30 | End: 2018-10-30 | Stop reason: SDUPTHER

## 2018-10-30 RX ORDER — DIPHENHYDRAMINE HYDROCHLORIDE 50 MG/ML
12.5 INJECTION, SOLUTION INTRAMUSCULAR; INTRAVENOUS
Status: DISCONTINUED | OUTPATIENT
Start: 2018-10-30 | End: 2018-10-31 | Stop reason: HOSPADM

## 2018-10-30 RX ORDER — GLYCOPYRROLATE 0.2 MG/ML
INJECTION INTRAMUSCULAR; INTRAVENOUS AS NEEDED
Status: DISCONTINUED | OUTPATIENT
Start: 2018-10-30 | End: 2018-10-30 | Stop reason: HOSPADM

## 2018-10-30 RX ORDER — SODIUM CHLORIDE 0.9 % (FLUSH) 0.9 %
5-10 SYRINGE (ML) INJECTION AS NEEDED
Status: DISCONTINUED | OUTPATIENT
Start: 2018-10-30 | End: 2018-10-30 | Stop reason: HOSPADM

## 2018-10-30 RX ORDER — BISACODYL 5 MG
5 TABLET, DELAYED RELEASE (ENTERIC COATED) ORAL DAILY PRN
Status: DISCONTINUED | OUTPATIENT
Start: 2018-10-30 | End: 2018-10-31 | Stop reason: HOSPADM

## 2018-10-30 RX ORDER — NALOXONE HYDROCHLORIDE 0.4 MG/ML
0.1 INJECTION, SOLUTION INTRAMUSCULAR; INTRAVENOUS; SUBCUTANEOUS AS NEEDED
Status: DISCONTINUED | OUTPATIENT
Start: 2018-10-30 | End: 2018-10-30

## 2018-10-30 RX ORDER — KETOROLAC TROMETHAMINE 30 MG/ML
30 INJECTION, SOLUTION INTRAMUSCULAR; INTRAVENOUS AS NEEDED
Status: DISCONTINUED | OUTPATIENT
Start: 2018-10-30 | End: 2018-10-30

## 2018-10-30 RX ORDER — SODIUM CHLORIDE 0.9 % (FLUSH) 0.9 %
5-10 SYRINGE (ML) INJECTION AS NEEDED
Status: DISCONTINUED | OUTPATIENT
Start: 2018-10-30 | End: 2018-10-30 | Stop reason: SDUPTHER

## 2018-10-30 RX ORDER — OXYCODONE HYDROCHLORIDE 5 MG/1
10 TABLET ORAL
Status: DISCONTINUED | OUTPATIENT
Start: 2018-10-30 | End: 2018-10-30

## 2018-10-30 RX ORDER — ATROPA BELLADONNA AND OPIUM 16.2; 6 MG/1; MG/1
SUPPOSITORY RECTAL AS NEEDED
Status: DISCONTINUED | OUTPATIENT
Start: 2018-10-30 | End: 2018-10-30 | Stop reason: HOSPADM

## 2018-10-30 RX ORDER — GABAPENTIN 300 MG/1
600 CAPSULE ORAL ONCE
Status: COMPLETED | OUTPATIENT
Start: 2018-10-30 | End: 2018-10-30

## 2018-10-30 RX ORDER — SODIUM CHLORIDE 0.9 % (FLUSH) 0.9 %
5-10 SYRINGE (ML) INJECTION EVERY 8 HOURS
Status: DISCONTINUED | OUTPATIENT
Start: 2018-10-30 | End: 2018-10-31 | Stop reason: HOSPADM

## 2018-10-30 RX ORDER — KETOROLAC TROMETHAMINE 30 MG/ML
30 INJECTION, SOLUTION INTRAMUSCULAR; INTRAVENOUS
Status: DISCONTINUED | OUTPATIENT
Start: 2018-10-30 | End: 2018-10-30 | Stop reason: SDUPTHER

## 2018-10-30 RX ORDER — KETOROLAC TROMETHAMINE 30 MG/ML
INJECTION, SOLUTION INTRAMUSCULAR; INTRAVENOUS AS NEEDED
Status: DISCONTINUED | OUTPATIENT
Start: 2018-10-30 | End: 2018-10-30 | Stop reason: HOSPADM

## 2018-10-30 RX ORDER — NEOSTIGMINE METHYLSULFATE 1 MG/ML
INJECTION INTRAVENOUS AS NEEDED
Status: DISCONTINUED | OUTPATIENT
Start: 2018-10-30 | End: 2018-10-30 | Stop reason: HOSPADM

## 2018-10-30 RX ADMIN — OXYCODONE HYDROCHLORIDE AND ACETAMINOPHEN 1 TABLET: 5; 325 TABLET ORAL at 15:00

## 2018-10-30 RX ADMIN — CLINDAMYCIN PHOSPHATE 900 MG: 900 INJECTION, SOLUTION INTRAVENOUS at 10:23

## 2018-10-30 RX ADMIN — ONDANSETRON 4 MG: 2 INJECTION INTRAMUSCULAR; INTRAVENOUS at 11:47

## 2018-10-30 RX ADMIN — FENTANYL CITRATE 25 MCG: 50 INJECTION, SOLUTION INTRAMUSCULAR; INTRAVENOUS at 12:19

## 2018-10-30 RX ADMIN — SODIUM CHLORIDE, SODIUM LACTATE, POTASSIUM CHLORIDE, AND CALCIUM CHLORIDE: 600; 310; 30; 20 INJECTION, SOLUTION INTRAVENOUS at 10:34

## 2018-10-30 RX ADMIN — PROPOFOL 200 MG: 10 INJECTION, EMULSION INTRAVENOUS at 10:19

## 2018-10-30 RX ADMIN — ROCURONIUM BROMIDE 10 MG: 10 INJECTION, SOLUTION INTRAVENOUS at 11:08

## 2018-10-30 RX ADMIN — ROCURONIUM BROMIDE 5 MG: 10 INJECTION, SOLUTION INTRAVENOUS at 11:26

## 2018-10-30 RX ADMIN — DEXAMETHASONE SODIUM PHOSPHATE 10 MG: 4 INJECTION, SOLUTION INTRA-ARTICULAR; INTRALESIONAL; INTRAMUSCULAR; INTRAVENOUS; SOFT TISSUE at 10:25

## 2018-10-30 RX ADMIN — DOCUSATE SODIUM 100 MG: 100 CAPSULE, LIQUID FILLED ORAL at 17:49

## 2018-10-30 RX ADMIN — FENTANYL CITRATE 50 MCG: 50 INJECTION, SOLUTION INTRAMUSCULAR; INTRAVENOUS at 10:59

## 2018-10-30 RX ADMIN — ROCURONIUM BROMIDE 40 MG: 10 INJECTION, SOLUTION INTRAVENOUS at 10:19

## 2018-10-30 RX ADMIN — FENTANYL CITRATE 50 MCG: 50 INJECTION, SOLUTION INTRAMUSCULAR; INTRAVENOUS at 10:19

## 2018-10-30 RX ADMIN — OXYCODONE HYDROCHLORIDE AND ACETAMINOPHEN 1 TABLET: 5; 325 TABLET ORAL at 19:33

## 2018-10-30 RX ADMIN — NEOSTIGMINE METHYLSULFATE 1 MG: 1 INJECTION INTRAVENOUS at 12:03

## 2018-10-30 RX ADMIN — Medication 10 ML: at 14:27

## 2018-10-30 RX ADMIN — FENTANYL CITRATE 25 MCG: 50 INJECTION, SOLUTION INTRAMUSCULAR; INTRAVENOUS at 12:13

## 2018-10-30 RX ADMIN — FENTANYL CITRATE 50 MCG: 50 INJECTION, SOLUTION INTRAMUSCULAR; INTRAVENOUS at 10:31

## 2018-10-30 RX ADMIN — SODIUM CHLORIDE 12.5 MG: 9 INJECTION INTRAMUSCULAR; INTRAVENOUS; SUBCUTANEOUS at 14:24

## 2018-10-30 RX ADMIN — LIDOCAINE HYDROCHLORIDE 0.1 ML: 10 INJECTION, SOLUTION INFILTRATION; PERINEURAL at 08:46

## 2018-10-30 RX ADMIN — KETOROLAC TROMETHAMINE 30 MG: 30 INJECTION, SOLUTION INTRAMUSCULAR; INTRAVENOUS at 11:47

## 2018-10-30 RX ADMIN — ACETAMINOPHEN 1000 MG: 10 INJECTION, SOLUTION INTRAVENOUS at 11:55

## 2018-10-30 RX ADMIN — NEOSTIGMINE METHYLSULFATE 1 MG: 1 INJECTION INTRAVENOUS at 12:00

## 2018-10-30 RX ADMIN — GLYCOPYRROLATE 0.2 MG: 0.2 INJECTION INTRAMUSCULAR; INTRAVENOUS at 12:03

## 2018-10-30 RX ADMIN — FENTANYL CITRATE 100 MCG: 50 INJECTION, SOLUTION INTRAMUSCULAR; INTRAVENOUS at 10:45

## 2018-10-30 RX ADMIN — LIDOCAINE HYDROCHLORIDE 100 MG: 20 INJECTION, SOLUTION EPIDURAL; INFILTRATION; INTRACAUDAL; PERINEURAL at 10:19

## 2018-10-30 RX ADMIN — MIDAZOLAM 1 MG: 1 INJECTION INTRAMUSCULAR; INTRAVENOUS at 09:44

## 2018-10-30 RX ADMIN — GENTAMICIN SULFATE 320 MG: 40 INJECTION, SOLUTION INTRAMUSCULAR; INTRAVENOUS at 08:46

## 2018-10-30 RX ADMIN — SODIUM CHLORIDE, SODIUM LACTATE, POTASSIUM CHLORIDE, AND CALCIUM CHLORIDE 125 ML/HR: 600; 310; 30; 20 INJECTION, SOLUTION INTRAVENOUS at 08:46

## 2018-10-30 RX ADMIN — GLYCOPYRROLATE 0.2 MG: 0.2 INJECTION INTRAMUSCULAR; INTRAVENOUS at 12:00

## 2018-10-30 RX ADMIN — GABAPENTIN 600 MG: 300 CAPSULE ORAL at 08:46

## 2018-10-30 NOTE — OP NOTES
ROBOTICS OPERATIVE REPORT    NAME: Sloan Garcia    DATE OF SURGERY: 10/30/2018    Pre-Op Diagnosis: Pelvic pain in female [R10.2]  Endometriosis [N80.9]  Pelvic adhesions [N73.6]  Cyst of ovary, left [N83.202]    Post-Op Diagnosis: Pelvic pain in female [R10.2]  Endometriosis [N80.9]  Pelvic adhesions [N73.6]  Cyst of ovary, left [N83.202]      Procedure: Procedure(s): HYSTERECTOMY ROBOTIC ASSISTED WITH LEFT SALPINGECTOMY AND OOPHORECTOMY    Surgeon: Willy Saenz MD    Anesthesia: General     Estimated Blood Loss: 125cc    Specimens:   ID Type Source Tests Collected by Time Destination   1 : Uterus; left tube and ovary Fresh Uterus with Fallopian Tube & Ovary  Eloina Holliday MD 10/30/2018 1127 Pathology        Complications: none    DRAINS: Carbajal catheter    FINDINGS: very boggy uterus, Copius adhesions of anterior uterus to abdominal wall adnexal adehsion     DESCRIPTION: After an adequate level of anesthesia was  obtained, the patient was prepped and draped in the usual sterile fashion  in the dorsal lithotomy position. A weighted speculum was placed in the  anterior aspect and the cervix was grasped with a tenaculum. The uterus  was sounded to a depth of 8 cm. The V care was placed and cup was sutured  to the cervix. The infraumbilical incision was made and the Veress  needle inserted insufflating the peritoneal cavity with CO2. The trocar  was placed. Under visualization 4 lateral ports were placed on both  sides. The robot was then attached to the trocars. After instruments were placed under visualization and went to the  console Ad hesions of adnexa were taken down with sharp and blunt dissection without energy. The right and then left infundibulopelvic ligament was coagulated and cut. The right and then left  broad ligament was coagulated and cut. Adhesions were removed from anterior aspect of uterus . The bladder flap was  developed on the right side and furthered on the left. The round ligament was coagulated and cut bilaterally . The  bladder flap was continued to be developed. The uterine vessels were  then skeletonized and coagulated and cut. The cardinal ligaments were   taken down to the Vcare ring  and this was circumscribed anteriorly and  posteriorly. Hemostasis was noted. The uterus, left  Tube  and ovary were  removed. The vaginal cuff was closed with 0 Vicryl in a running fashion  and with securing uterosacral ligaments  on each side and closed midline. The area was irrigated free of blood clot and debris. Hemostasis was  noted at all points. She had slight oozing during procedure with very friable tisse so Floseal was placed. The robot was detached. I again scrubbed, removed   the trocars and closed each of the port sites with pressure dressings  on the skin. Vaginal lacerations noted from manipulator . 3-0 vicryl placed. Kamala used vaginally. The patient tolerated the procedure well and went to the  recovery room in good condition.           Jenn Chand MD A

## 2018-10-30 NOTE — ROUTINE PROCESS
TRANSFER - OUT REPORT:    Verbal report given to mikey jeter(name) on [facility]  being transferred to [facility] surg(unit) for routine post - op       Report consisted of patients Situation, Background, Assessment and   Recommendations(SBAR). Information from the following report(s) SBAR was reviewed with the receiving nurse. Opportunity for questions and clarification was provided.       Patient transported with:   O2 @ 0 liters

## 2018-10-30 NOTE — PROGRESS NOTES
's Pre-op visit requested by patient. Conveyed care and concern for patient and family. Offered prayer as requested for patient, family, and staff.     Pinky Johnson MDiv, BS  Board Certified

## 2018-10-30 NOTE — PROGRESS NOTES
TRANSFER - IN REPORT:    Verbal report received from [address]. RN(name) on [facility]  being received from PACU(unit) for routine progression of care      Report consisted of patients Situation, Background, Assessment and   Recommendations(SBAR). Information from the following report(s) Kardex was reviewed with the receiving nurse. Opportunity for questions and clarification was provided. Assessment completed upon patients arrival to unit and care assumed.

## 2018-10-30 NOTE — ANESTHESIA POSTPROCEDURE EVALUATION
Procedure(s): HYSTERECTOMY ROBOTIC ASSISTED WITH LEFT SALPINGECTOMY AND OOPHORECTOMY. Anesthesia Post Evaluation Multimodal analgesia: multimodal analgesia not used between 6 hours prior to anesthesia start to PACU discharge Patient location during evaluation: PACU Patient participation: complete - patient participated Level of consciousness: awake and alert Pain management: adequate Airway patency: patent Anesthetic complications: no 
Cardiovascular status: hemodynamically stable Respiratory status: acceptable Hydration status: acceptable Visit Vitals /77 (BP 1 Location: Left arm, BP Patient Position: At rest) Pulse 70 Temp 36.6 °C (97.8 °F) Resp 16 Wt 75.3 kg (166 lb) SpO2 98% BMI 28.49 kg/m²

## 2018-10-30 NOTE — H&P
Subjective:     Patient is a 41 y.o.  female presents with pelvic pain with previous laparoscpic dx of endometriosis and right oophorectomy . gradually worsening course. Patient Active Problem List    Diagnosis Date Noted    Gastroesophageal reflux disease without esophagitis 04/23/2015    Hyperhidrosis of palms 10/11/2012     Past Medical History:   Diagnosis Date    Esophageal reflux     Nausea & vomiting     post-op    Ovarian cyst       Past Surgical History:   Procedure Laterality Date    HX APPENDECTOMY  2000    HX GYN  12/2017    RSO, left ovarian cystectomy    HX ORTHOPAEDIC Right May 2014    hand fracture - surgery after injury    HX OVARIAN CYST REMOVAL  2000    right      [unfilled]  Allergies   Allergen Reactions    Bactrim [Sulfamethoxazole-Trimethoprim] Rash    Cephalexin Other (comments)     Mouth sores    Codeine Nausea and Vomiting    Erythromycin Other (comments)     GI - Zithromax ok      Social History     Tobacco Use    Smoking status: Never Smoker    Smokeless tobacco: Never Used   Substance Use Topics    Alcohol use: No      Family History   Problem Relation Age of Onset    No Known Problems Mother     No Known Problems Father     No Known Problems Brother       Review of Systems  A comprehensive review of systems was negative except for that written in the HPI. Objective:     Patient Vitals for the past 8 hrs:   BP Pulse Resp SpO2 Weight   10/30/18 0812 133/64 92 16 98 % 166 lb (75.3 kg)   10/30/18 0756 -- -- -- -- 165 lb (74.8 kg)     No intake or output data in the 24 hours ending 10/30/18 0934      General: Alert . Oriented x3   HEENT: Normocephalic PEERL   Heart: RRR   Lungs: Clear   Abdominal: Bowel sounds are normal, liver is not enlarged, spleen is not enlarged   Neurological: Alert and oriented X 3, normal strength and tone. Normal symmetric reflexes.  Normal coordination and gait   Extremities: extremities normal, atraumatic, no cyanosis or edema Assessment:       Pelvic pain  Endometriosis      Plan:       Procedure(s): HYSTERECTOMY ROBOTIC ASSISTED WITH LEFT SALPINGECTOMY AND OOPHORECTOMY          The procedure was reviewed in detail as well as the risks of bleeding, infection, DVT and potential surgical complications involving the bladder, ureters, colon or intestines. Also the alternatives,  benefits, recovery and follow-up. All of her questions were answered.     Discussed  Probable need for ERT after surgey

## 2018-10-30 NOTE — PROGRESS NOTES
Shift assessment complete. Respirations present. Even and unlabored. No s/s of distress. Zero c/o pain at this time. Call light within reach. Encouraged patient to call for assistance. Patient verbalizes understanding. See Doc Flowsheet for assessment details. Patient resting in bed. 5 puncture sites to abdomen. Abdomen soft. SCDs on bilaterally. 02/2l via nasal cannula. Saline well intact to left hand . Family at bedside. Some nausea noted. Instructions on incentive spirometer given to patient and mother.

## 2018-10-31 VITALS
RESPIRATION RATE: 16 BRPM | DIASTOLIC BLOOD PRESSURE: 79 MMHG | OXYGEN SATURATION: 97 % | WEIGHT: 166 LBS | BODY MASS INDEX: 28.49 KG/M2 | SYSTOLIC BLOOD PRESSURE: 122 MMHG | TEMPERATURE: 97.7 F | HEART RATE: 91 BPM

## 2018-10-31 PROCEDURE — 74011250637 HC RX REV CODE- 250/637: Performed by: OBSTETRICS & GYNECOLOGY

## 2018-10-31 PROCEDURE — 99218 HC RM OBSERVATION: CPT

## 2018-10-31 RX ORDER — OXYCODONE AND ACETAMINOPHEN 5; 325 MG/1; MG/1
1 TABLET ORAL
Qty: 15 TAB | Refills: 0 | Status: SHIPPED | OUTPATIENT
Start: 2018-10-31 | End: 2018-11-07

## 2018-10-31 RX ORDER — ESTRADIOL 1 MG/1
1 TABLET ORAL DAILY
Qty: 30 TAB | Refills: 11 | Status: SHIPPED | OUTPATIENT
Start: 2018-10-31 | End: 2019-06-06

## 2018-10-31 RX ADMIN — OXYCODONE HYDROCHLORIDE AND ACETAMINOPHEN 1 TABLET: 5; 325 TABLET ORAL at 03:28

## 2018-10-31 RX ADMIN — Medication 10 ML: at 05:22

## 2018-10-31 RX ADMIN — OXYCODONE HYDROCHLORIDE AND ACETAMINOPHEN 1 TABLET: 5; 325 TABLET ORAL at 07:53

## 2018-10-31 RX ADMIN — DOCUSATE SODIUM 100 MG: 100 CAPSULE, LIQUID FILLED ORAL at 07:53

## 2018-10-31 NOTE — PROGRESS NOTES
Assessment completed and documented. Lung sounds diminished in bases. Heart sounds regular. Abdomen soft and tender. Not passing gas yet. Bowel sounds active. Old drainage to left upper lap site. Currently resting in bed. Will continue to monitor with hourly rounding.

## 2018-10-31 NOTE — DISCHARGE INSTRUCTIONS
DISCHARGE SUMMARY from Nurse    PATIENT INSTRUCTIONS:    After general anesthesia or intravenous sedation, for 24 hours or while taking prescription Narcotics:  · Limit your activities  · Do not drive and operate hazardous machinery  · Do not make important personal or business decisions  · Do  not drink alcoholic beverages  · If you have not urinated within 8 hours after discharge, please contact your surgeon on call. Report the following to your surgeon:  · Excessive pain, swelling, redness or odor of or around the surgical area  · Temperature over 100.5  · Nausea and vomiting lasting longer than 4 hours or if unable to take medications  · Any signs of decreased circulation or nerve impairment to extremity: change in color, persistent  numbness, tingling, coldness or increase pain  · Any questions    What to do at Home:  Recommended activity: Activity as tolerated, no sex/douching/tampons/tub baths, no heavy lifting, no driving while on pain medicine    If you experience any of the following symptoms uncontrolled pain/nausea/vomiting, fever or other signs of infection, heavy vaginal bleeding, vaginal discharge with foul odor, please follow up with Dr Toan Sewell ASAP. *  Please give a list of your current medications to your Primary Care Provider. *  Please update this list whenever your medications are discontinued, doses are      changed, or new medications (including over-the-counter products) are added. *  Please carry medication information at all times in case of emergency situations. These are general instructions for a healthy lifestyle:    No smoking/ No tobacco products/ Avoid exposure to second hand smoke  Surgeon General's Warning:  Quitting smoking now greatly reduces serious risk to your health.     Obesity, smoking, and sedentary lifestyle greatly increases your risk for illness    A healthy diet, regular physical exercise & weight monitoring are important for maintaining a healthy lifestyle    You may be retaining fluid if you have a history of heart failure or if you experience any of the following symptoms:  Weight gain of 3 pounds or more overnight or 5 pounds in a week, increased swelling in our hands or feet or shortness of breath while lying flat in bed. Please call your doctor as soon as you notice any of these symptoms; do not wait until your next office visit. Recognize signs and symptoms of STROKE:    F-face looks uneven    A-arms unable to move or move unevenly    S-speech slurred or non-existent    T-time-call 911 as soon as signs and symptoms begin-DO NOT go       Back to bed or wait to see if you get better-TIME IS BRAIN. Warning Signs of HEART ATTACK     Call 911 if you have these symptoms:   Chest discomfort. Most heart attacks involve discomfort in the center of the chest that lasts more than a few minutes, or that goes away and comes back. It can feel like uncomfortable pressure, squeezing, fullness, or pain.  Discomfort in other areas of the upper body. Symptoms can include pain or discomfort in one or both arms, the back, neck, jaw, or stomach.  Shortness of breath with or without chest discomfort.  Other signs may include breaking out in a cold sweat, nausea, or lightheadedness. Don't wait more than five minutes to call 911 - MINUTES MATTER! Fast action can save your life. Calling 911 is almost always the fastest way to get lifesaving treatment. Emergency Medical Services staff can begin treatment when they arrive -- up to an hour sooner than if someone gets to the hospital by car. The discharge information has been reviewed with the patient. The patient verbalized understanding. Discharge medications reviewed with the patient and appropriate educational materials and side effects teaching were provided.   ___________________________________________________________________________________________________________________________________ Laparoscopic Hysterectomy: What to Expect at [address] [facility]    A hysterectomy is surgery to take out the uterus. In some cases, the ovaries and fallopian tubes also are taken out at the same time. You can expect to feel better and stronger each day, but you may need pain medicine for a week or two. You may get tired easily or have less energy than usual. The tiredness may last for several weeks after surgery. You will probably notice that your belly is swollen and puffy. This is common. The swelling will take several weeks to go down. You may take about 4 to 6 weeks to fully recover. It is important to avoid lifting while you are recovering so that you can heal.  This care sheet gives you a general idea about how long it will take for you to recover. But each person recovers at a different pace. Follow the steps below to get better as quickly as possible. How can you care for yourself at home? Activity    · Rest when you feel tired.     · Be active. Walking is a good choice.     · Allow the area to heal. Don't move quickly or lift anything heavy until you are feeling better.     · You may shower 24 to 48 hours after surgery, if your doctor okays it. Pat the incision dry. Do not take a bath for the first 2 weeks, or until your doctor tells you it is okay.     · Ask your doctor when it is okay for you to have sex. Diet    · You can eat your normal diet. If your stomach is upset, try bland, low-fat foods like plain rice, broiled chicken, toast, and yogurt.     · If your bowel movements are not regular right after surgery, try to avoid constipation and straining. Drink plenty of water. Your doctor may suggest fiber, a stool softener, or a mild laxative. Medicines    · Your doctor will tell you if and when you can restart your medicines.  He or she will also give you instructions about taking any new medicines.     · If you take blood thinners, such as warfarin (Coumadin), clopidogrel (Plavix), or aspirin, be sure to talk to your doctor. He or she will tell you if and when to start taking those medicines again. Make sure that you understand exactly what your doctor wants you to do.     · Be safe with medicines. Read and follow all instructions on the label. ? If the doctor gave you a prescription medicine for pain, take it as prescribed. ? If you are not taking a prescription pain medicine, ask your doctor if you can take an over-the-counter medicine.     · If your doctor prescribed antibiotics, take them as directed. Do not stop taking them just because you feel better. You need to take the full course of antibiotics. Incision care    · You may have stitches over the cuts (incisions) the doctor made in your belly.     · If you have strips of tape on the cut (incision) the doctor made, leave the tape on for a week or until it falls off.     · Wash the area daily with warm, soapy water, and pat it dry. Don't use hydrogen peroxide or alcohol. They can slow healing.     · You may cover the area with a gauze bandage if it oozes fluid or rubs against clothing.     · Change the bandage every day. Other instructions    · You may have some light vaginal bleeding. Wear sanitary pads if needed. Do not douche or use tampons.     · Don't have sex until the doctor says it is okay. Follow-up care is a key part of your treatment and safety. Be sure to make and go to all appointments, and call your doctor if you are having problems. It's also a good idea to know your test results and keep a list of the medicines you take. When should you call for help? Call 911 anytime you think you may need emergency care.  For example, call if:    · You passed out (lost consciousness).     · You have chest pain, are short of breath, or cough up blood.    Call your doctor now or seek immediate medical care if:    · You have pain that does not get better after you take pain medicine.     · You cannot pass stoolsl or gas.     · You have vaginal discharge that has increased in amount or smells bad.     · You are sick to your stomach or cannot drink fluids.     · You have loose stitches, or your incision comes open.     · Bright red blood has soaked through the bandage over your incision.     · You have signs of infection, such as:  ? Increased pain, swelling, warmth, or redness. ? Red streaks leading from the incision. ? Pus draining from the incision. ? A fever.     · You have bright red vaginal bleeding that soaks one or more pads in an hour, or you have large clots.     · You have signs of a blood clot in your leg (called a deep vein thrombosis), such as:  ? Pain in your calf, back of the knee, thigh, or groin. ? Redness and swelling in your leg or groin.    Watch closely for changes in your health, and be sure to contact your doctor if you have any problems. Where can you learn more? Go to http://patito-twin.info/. Enter Q131 in the search box to learn more about \"Laparoscopic Hysterectomy: What to Expect at Home. \"  Current as of: May 15, 2018  Content Version: 11.8  © 6940-9169 [employer]. Care instructions adapted under license by [employer] (which disclaims liability or warranty for this information). If you have questions about a medical condition or this instruction, always ask your healthcare professional. [address] any warranty or liability for your use of this information.

## 2018-10-31 NOTE — PROGRESS NOTES
PM assessment complete. A/O x4. Respirations present, non-labored. Abdomen soft, tender, 5PS - CDI. Voiding at intervals. Aware to call if needs arise.

## 2018-10-31 NOTE — PROGRESS NOTES
Discharge instructions were reviewed with the patient and family. Opportunity for questions given. Patient verbalized understanding of discharge and follow up instructions. PIV was removed. Prescriptions provided. Patient finishing breakfast, getting dressed and will call when ready to be taken out.

## 2021-08-07 ENCOUNTER — HOSPITAL ENCOUNTER (OUTPATIENT)
Dept: MAMMOGRAPHY | Age: 41
Discharge: HOME | End: 2021-08-07
Attending: OBSTETRICS & GYNECOLOGY
Payer: COMMERCIAL

## 2021-08-07 DIAGNOSIS — Z12.31 VISIT FOR SCREENING MAMMOGRAM: ICD-10-CM

## 2021-08-07 PROCEDURE — 77067 SCR MAMMO BI INCL CAD: CPT

## 2022-03-18 PROBLEM — R10.2 PELVIC PAIN: Status: ACTIVE | Noted: 2018-10-30

## 2022-08-13 ENCOUNTER — HOSPITAL ENCOUNTER (OUTPATIENT)
Dept: MAMMOGRAPHY | Age: 42
Discharge: HOME | End: 2022-08-16
Payer: COMMERCIAL

## 2022-08-13 DIAGNOSIS — Z12.31 VISIT FOR SCREENING MAMMOGRAM: ICD-10-CM

## 2022-08-13 PROCEDURE — 77063 BREAST TOMOSYNTHESIS BI: CPT

## 2023-02-20 ENCOUNTER — OFFICE VISIT (OUTPATIENT)
Dept: GYNECOLOGY | Age: 43
End: 2023-02-20
Payer: COMMERCIAL

## 2023-02-20 VITALS
BODY MASS INDEX: 26.77 KG/M2 | DIASTOLIC BLOOD PRESSURE: 80 MMHG | WEIGHT: 156.8 LBS | HEIGHT: 64 IN | SYSTOLIC BLOOD PRESSURE: 102 MMHG

## 2023-02-20 DIAGNOSIS — R10.2 PELVIC CRAMPING: ICD-10-CM

## 2023-02-20 DIAGNOSIS — N95.2 VAGINAL ATROPHY: ICD-10-CM

## 2023-02-20 DIAGNOSIS — N93.9 VAGINAL BLEEDING: Primary | ICD-10-CM

## 2023-02-20 DIAGNOSIS — Z12.72 VAGINAL SMEAR: ICD-10-CM

## 2023-02-20 LAB
BILIRUBIN, URINE, POC: NEGATIVE
BLOOD URINE, POC: NEGATIVE
GLUCOSE URINE, POC: NEGATIVE
KETONES, URINE, POC: NEGATIVE
LEUKOCYTE ESTERASE, URINE, POC: ABNORMAL
NITRITE, URINE, POC: NEGATIVE
PH, URINE, POC: 6 (ref 4.6–8)
PROTEIN,URINE, POC: NEGATIVE
SPECIFIC GRAVITY, URINE, POC: 1.01 (ref 1–1.03)
URINALYSIS CLARITY, POC: CLEAR
URINALYSIS COLOR, POC: YELLOW
UROBILINOGEN, POC: ABNORMAL

## 2023-02-20 PROCEDURE — 99214 OFFICE O/P EST MOD 30 MIN: CPT | Performed by: OBSTETRICS & GYNECOLOGY

## 2023-02-20 PROCEDURE — 81003 URINALYSIS AUTO W/O SCOPE: CPT | Performed by: OBSTETRICS & GYNECOLOGY

## 2023-02-20 RX ORDER — ESTRADIOL 10 UG/1
10 INSERT VAGINAL
Qty: 36 TABLET | Refills: 4 | Status: SHIPPED | OUTPATIENT
Start: 2023-02-20

## 2023-02-20 RX ORDER — ESTRADIOL 10 UG/1
INSERT VAGINAL
Qty: 36 TABLET | Refills: 4 | Status: SHIPPED | OUTPATIENT
Start: 2023-02-20 | End: 2023-02-20

## 2023-02-20 RX ORDER — IBUPROFEN 800 MG/1
1 TABLET ORAL
COMMUNITY

## 2023-02-20 NOTE — PROGRESS NOTES
LAUREN Luciano is a 40 y.o. female seen for vaginal bleeding. She has had complete hysterectomy and BSO. She had spotting which she described similar to the start of a period for several days. She is not sexually active    Past Medical History, Past Surgical History, Family history, Social History, and Medications were all reviewed with the patient today and updated as necessary. Current Outpatient Medications   Medication Sig    ibuprofen (ADVIL;MOTRIN) 800 MG tablet Take 1 tablet by mouth    Estradiol (VAGIFEM) 10 MCG TABS vaginal tablet Place 1 tablet vaginally three times a week    estradiol (ESTRACE) 1 MG tablet Take 1 mg by mouth daily    hyoscyamine (LEVSIN) 125 MCG/5ML ELIX elixir Take 0.125 mg by mouth    omeprazole (PRILOSEC) 10 MG delayed release capsule Take 10 mg by mouth daily     No current facility-administered medications for this visit.      Allergies   Allergen Reactions    Cephalexin Other (See Comments)     Mouth sores    Erythromycin Other (See Comments)     GI - Zithromax ok    Codeine Nausea And Vomiting    Sulfamethoxazole-Trimethoprim Rash     Past Medical History:   Diagnosis Date    Endometriosis     Esophageal reflux     H/O: hysterectomy 10/30/2018    IBS (irritable bowel syndrome)     Ovarian cyst      Past Surgical History:   Procedure Laterality Date    APPENDECTOMY  2000    COLONOSCOPY  12/11/2019    Normal    GYN  12/2017    RSO, left ovarian cystectomy    HYSTERECTOMY (CERVIX STATUS UNKNOWN)  10/2018    LSO - endometriosis    ORTHOPEDIC SURGERY Right May 2014    hand fracture - surgery after injury    OVARIAN CYST REMOVAL  2000    right     Family History   Problem Relation Age of Onset    No Known Problems Father     No Known Problems Brother     No Known Problems Mother       Social History     Tobacco Use    Smoking status: Never    Smokeless tobacco: Never   Substance Use Topics    Alcohol use: No       Social History     Substance and Sexual Activity Sexual Activity Never    Birth control/protection: Surgical    Comment: Never Been Sexually active     OB History    Para Term  AB Living   0 0 0 0 0 0   SAB IAB Ectopic Molar Multiple Live Births   0 0 0 0 0 0       ROS:    Review of Systems  General: Not Present- Chills, Fever, Fatigue, Insomnia, Hot flashes/Night sweats, Weight gain  Skin: Not Present- Bruising, Change in Wart/Mole, Excessive Sweating, Itching, Nail Changes, New Lesions, Rash, Skin Color Changes and Ulcer. HEENT: Not Present- Headache, Blurred Vision, Double Vision, Glaucoma, Visual Disturbances, Hearing Loss, Ringing in the Ears, Vertigo, Nose Bleed, Bleeding Gums, Hoarseness and Sore Throat. Neck: Not Present- Neck Pain and Neck Swelling. Respiratory: Not Present- Cough, Difficulty Breathing and Difficulty Breathing on Exertion. Breast: Not Present- Breast Mass, Breast Pain, Breast Swelling, Nipple Discharge, Nipple Pain, Recent Breast Size Changes and Skin Changes. Cardiovascular: Not Present- Abnormal Blood Pressure, Chest Pain, Edema, Fainting / Blacking Out, Palpitations, Shortness of Breath and Swelling of Extremities. Gastrointestinal: Not Present- Abdominal Pain, Abdominal Swelling, Bloating, Change in Bowel Habits, Constipation, Diarrhea, Difficulty Swallowing, Gets full quickly at meals, Nausea, Rectal Bleeding and Vomiting. Female Genitourinary: Not Present- Dysmenorrhea, Dyspareunia, Decreased libido, Excessive Menstrual Bleeding, Menstrual Irregularities, Pelvic Pain, Urinary Complaints, Vaginal Discharge, Vaginal itching/burning, Vaginal odor  Musculoskeletal: Not Present- Joint Pain and Muscle Pain. Neurological: Not Present- Dizziness, Fainting, Headaches and Seizures. Psychiatric: Not Present- Anxiety, Depression, Mood changes and Panic Attacks. Endocrine: Not Present- Appetite Changes, Cold Intolerance, Excessive Thirst, Excessive Urination and Heat Intolerance.   Hematology: Not Present- Abnormal Bleeding, Easy Bruising and Enlarged Lymph Nodes. PHYSICAL EXAM:    /80   Ht 5' 4\" (1.626 m)   Wt 156 lb 12.8 oz (71.1 kg)   BMI 26.91 kg/m²     Physical Exam   General   Mental Status - Alert. General Appearance - Cooperative. Abdomen   Inspection: - Inspection Normal.   Palpation/Percussion: Palpation and Percussion of the abdomen reveal - Non Tender, No Rebound tenderness, No Rigidity (guarding), No hepatosplenomegaly, No Palpable abdominal masses and Soft. Auscultation: Auscultation of the abdomen reveals - Bowel sounds normal.     Female Genitourinary     External Genitalia   Vulva: - Normal. Perineum - Normal. Bartholin's Gland - Bilateral - Normal. Clitoris - Normal.   Introitus: Characteristics - Normal.   Urethra: Characteristics - Normal.     Speculum & Bimanual   Vagina: Vaginal Mucosa - Normal.   Vaginal Wall: - Normal.   Vagina - There is an area of irritation at the mid portion of the back of the vagina. This bleeds easily with PAP  Cervix: Characteristics -  Surgically absent  Uterus: Characteristics -Surgically absent  Adnexa: - Normal.   Bladder - Normal.       Lymphatics  No cervical, axillary or groin adenopathy            Medical problems and test results were reviewed with the patient today. ASSESSMENT and PLAN    1. Vaginal bleeding  -     Nuswab Vaginitis (VG)  2. Pelvic cramping  -     AMB POC URINALYSIS DIP STICK AUTO W/O MICRO  3. Vaginal atrophy  -     Estradiol (VAGIFEM) 10 MCG TABS vaginal tablet; Place 1 tablet vaginally three times a week, Disp-36 tablet, R-4Normal  4. Vaginal smear  -     PAP LB, Reflex HPV ASCUS     Vaginal PAP smear. NuSwab. Vagifem. Recheck in 6 weeks.         Time:  I spent  30 minutes in preparing to see patient (including chart review and preparation), obtaining and/or reviewing additional medical history, performing a physical exam and evaluation, documenting clinical information in the electronic health record, independently interpreting results, communicating results to patient, family or caregiver, and/or coordinating care. No follow-ups on file.        Leola Rios MD

## 2023-02-23 LAB
CYTOLOGIST CVX/VAG CYTO: NORMAL
CYTOLOGY CVX/VAG DOC THIN PREP: NORMAL
HPV REFLEX: NORMAL
Lab: NORMAL
PATH REPORT.FINAL DX SPEC: NORMAL
STAT OF ADQ CVX/VAG CYTO-IMP: NORMAL

## 2023-02-24 ENCOUNTER — PATIENT MESSAGE (OUTPATIENT)
Dept: GYNECOLOGY | Age: 43
End: 2023-02-24

## 2023-02-24 RX ORDER — ESTRADIOL 0.1 MG/G
1 CREAM VAGINAL
Qty: 42.5 G | Refills: 3 | Status: SHIPPED | OUTPATIENT
Start: 2023-02-24 | End: 2024-02-24

## 2023-02-24 NOTE — TELEPHONE ENCOUNTER
From: Magdalena Almanza  To: Dr. Pratt Resides: 2/24/2023 5:03 AM EST  Subject: Estradiol 10 MCG Vaginal Insert    Hi! this medicine makes my right hand feel funny. It feels like when your foot goes to sleep that numb tingly feeling. Is there a way I can try something different. Plus it makes me draggy and dizzy.

## 2023-02-26 LAB
A VAGINAE DNA VAG QL NAA+PROBE: NORMAL SCORE
BVAB2 DNA VAG QL NAA+PROBE: NORMAL SCORE
C ALBICANS DNA VAG QL NAA+PROBE: NEGATIVE
C GLABRATA DNA VAG QL NAA+PROBE: NEGATIVE
MEGA1 DNA VAG QL NAA+PROBE: NORMAL SCORE
SPECIMEN SOURCE: NORMAL
T VAGINALIS RRNA SPEC QL NAA+PROBE: NEGATIVE

## 2023-04-06 ENCOUNTER — OFFICE VISIT (OUTPATIENT)
Dept: GYNECOLOGY | Age: 43
End: 2023-04-06
Payer: COMMERCIAL

## 2023-04-06 VITALS
SYSTOLIC BLOOD PRESSURE: 112 MMHG | WEIGHT: 155 LBS | DIASTOLIC BLOOD PRESSURE: 76 MMHG | BODY MASS INDEX: 26.46 KG/M2 | HEIGHT: 64 IN

## 2023-04-06 DIAGNOSIS — N95.2 VAGINAL ATROPHY: Primary | ICD-10-CM

## 2023-04-06 PROCEDURE — 99214 OFFICE O/P EST MOD 30 MIN: CPT | Performed by: OBSTETRICS & GYNECOLOGY

## 2023-04-06 RX ORDER — DICLOFENAC SODIUM 75 MG/1
75 TABLET, DELAYED RELEASE ORAL 2 TIMES DAILY
COMMUNITY
Start: 2023-03-23

## 2023-04-06 RX ORDER — OMEPRAZOLE 20 MG/1
CAPSULE, DELAYED RELEASE ORAL
COMMUNITY
Start: 2023-02-06

## 2023-07-23 ENCOUNTER — PATIENT MESSAGE (OUTPATIENT)
Dept: GYNECOLOGY | Age: 43
End: 2023-07-23

## 2023-07-23 DIAGNOSIS — N95.1 MENOPAUSE SYNDROME: Primary | ICD-10-CM

## 2023-07-24 RX ORDER — ESTRADIOL 1 MG/1
1 TABLET ORAL DAILY
Qty: 90 TABLET | Refills: 3 | Status: SHIPPED | OUTPATIENT
Start: 2023-07-24

## 2023-07-24 NOTE — TELEPHONE ENCOUNTER
From: Melina Almanza  To: Dr. Miles Duet: 7/23/2023 6:10 PM EDT  Subject: Estradiol 1 MG Tablet    Hi! I am low on my pills and don't have any more refills left could you refill my prescription and add some refills I would appreciate it! Thank you!

## 2023-08-19 ENCOUNTER — HOSPITAL ENCOUNTER (OUTPATIENT)
Dept: MAMMOGRAPHY | Age: 43
End: 2023-08-19
Payer: COMMERCIAL

## 2023-08-19 DIAGNOSIS — Z12.31 OTHER SCREENING MAMMOGRAM: ICD-10-CM

## 2023-08-19 PROCEDURE — 77063 BREAST TOMOSYNTHESIS BI: CPT

## 2024-02-27 DIAGNOSIS — N95.2 VAGINAL ATROPHY: Primary | ICD-10-CM

## 2024-02-28 RX ORDER — ESTRADIOL 0.1 MG/G
1 CREAM VAGINAL
Qty: 42.5 G | Refills: 0 | Status: SHIPPED | OUTPATIENT
Start: 2024-02-29 | End: 2025-02-28

## 2024-04-18 NOTE — PROGRESS NOTES
status: Never    Smokeless tobacco: Never   Substance Use Topics    Alcohol use: No       Social History     Substance and Sexual Activity   Sexual Activity Never    Birth control/protection: Surgical    Comment: Never Been Sexually active     OB History    Para Term  AB Living   0 0 0 0 0 0   SAB IAB Ectopic Molar Multiple Live Births   0 0 0 0 0 0       Health Maintenance  Mammogram: 24  Colonoscopy:   Bone Density:    ROS:    Review of Systems  General: Not Present- Chills, Fever, Fatigue, Insomnia, Hot flashes/Night sweats, Weight gain  Skin: Not Present- Bruising, Change in Wart/Mole, Excessive Sweating, Itching, Nail Changes, New Lesions, Rash, Skin Color Changes and Ulcer.  HEENT: Not Present- Headache, Blurred Vision, Double Vision, Glaucoma, Visual Disturbances, Hearing Loss, Ringing in the Ears, Vertigo, Nose Bleed, Bleeding Gums, Hoarseness and Sore Throat.  Neck: Not Present- Neck Pain and Neck Swelling.  Respiratory: Not Present- Cough, Difficulty Breathing and Difficulty Breathing on Exertion.  Breast: Not Present- Breast Mass, Breast Pain, Breast Swelling, Nipple Discharge, Nipple Pain, Recent Breast Size Changes and Skin Changes.  Cardiovascular: Not Present- Abnormal Blood Pressure, Chest Pain, Edema, Fainting / Blacking Out, Palpitations, Shortness of Breath and Swelling of Extremities.  Gastrointestinal: Not Present- Abdominal Pain, Abdominal Swelling, Bloating, Change in Bowel Habits, Constipation, Diarrhea, Difficulty Swallowing, Gets full quickly at meals, Nausea, Rectal Bleeding and Vomiting.  Female Genitourinary: Not Present- Dysmenorrhea, Dyspareunia, Decreased libido, Excessive Menstrual Bleeding, Menstrual Irregularities, Pelvic Pain, Urinary Complaints, Vaginal Discharge, Vaginal itching/burning, Vaginal odor  Musculoskeletal: Not Present- Joint Pain and Muscle Pain.  Neurological: Not Present- Dizziness, Fainting, Headaches and Seizures.  Psychiatric: Not Present-

## 2024-04-23 ENCOUNTER — OFFICE VISIT (OUTPATIENT)
Dept: OBGYN CLINIC | Age: 44
End: 2024-04-23
Payer: COMMERCIAL

## 2024-04-23 VITALS — HEIGHT: 64 IN | WEIGHT: 155 LBS | BODY MASS INDEX: 26.46 KG/M2

## 2024-04-23 DIAGNOSIS — N95.2 VAGINAL ATROPHY: ICD-10-CM

## 2024-04-23 DIAGNOSIS — Z12.31 SCREENING MAMMOGRAM, ENCOUNTER FOR: ICD-10-CM

## 2024-04-23 DIAGNOSIS — N95.1 MENOPAUSE SYNDROME: Primary | ICD-10-CM

## 2024-04-23 PROCEDURE — 99214 OFFICE O/P EST MOD 30 MIN: CPT | Performed by: OBSTETRICS & GYNECOLOGY

## 2024-04-23 RX ORDER — ESTRADIOL 0.1 MG/G
CREAM VAGINAL
Qty: 42.5 G | Refills: 5 | Status: SHIPPED | OUTPATIENT
Start: 2024-04-25

## 2024-04-23 RX ORDER — ESTRADIOL 1 MG/1
1 TABLET ORAL DAILY
Qty: 90 TABLET | Refills: 4 | Status: SHIPPED | OUTPATIENT
Start: 2024-04-23

## 2024-08-24 ENCOUNTER — HOSPITAL ENCOUNTER (OUTPATIENT)
Dept: MAMMOGRAPHY | Age: 44
End: 2024-08-24
Attending: OBSTETRICS & GYNECOLOGY
Payer: COMMERCIAL

## 2024-08-24 VITALS — HEIGHT: 64 IN | BODY MASS INDEX: 26.46 KG/M2 | WEIGHT: 155 LBS

## 2024-08-24 DIAGNOSIS — Z12.31 SCREENING MAMMOGRAM, ENCOUNTER FOR: ICD-10-CM

## 2024-08-24 PROCEDURE — 77063 BREAST TOMOSYNTHESIS BI: CPT

## 2025-07-20 ENCOUNTER — PATIENT MESSAGE (OUTPATIENT)
Dept: OBGYN CLINIC | Age: 45
End: 2025-07-20

## 2025-07-20 DIAGNOSIS — N95.1 MENOPAUSE SYNDROME: ICD-10-CM

## 2025-07-21 RX ORDER — ESTRADIOL 1 MG/1
1 TABLET ORAL DAILY
Qty: 90 TABLET | Refills: 0 | Status: SHIPPED | OUTPATIENT
Start: 2025-07-21

## 2025-08-07 ENCOUNTER — TRANSCRIBE ORDERS (OUTPATIENT)
Facility: HOSPITAL | Age: 45
End: 2025-08-07

## 2025-08-07 DIAGNOSIS — Z12.31 OTHER SCREENING MAMMOGRAM: Primary | ICD-10-CM

## 2025-08-14 ENCOUNTER — OFFICE VISIT (OUTPATIENT)
Dept: OBGYN CLINIC | Age: 45
End: 2025-08-14

## 2025-08-14 VITALS — DIASTOLIC BLOOD PRESSURE: 80 MMHG | WEIGHT: 164 LBS | SYSTOLIC BLOOD PRESSURE: 120 MMHG | BODY MASS INDEX: 28.15 KG/M2

## 2025-08-14 DIAGNOSIS — N95.2 VAGINAL ATROPHY: ICD-10-CM

## 2025-08-14 DIAGNOSIS — N95.1 MENOPAUSE SYNDROME: Primary | ICD-10-CM

## 2025-08-14 PROCEDURE — 99214 OFFICE O/P EST MOD 30 MIN: CPT | Performed by: OBSTETRICS & GYNECOLOGY

## 2025-08-14 RX ORDER — ESTRADIOL 1 MG/1
1 TABLET ORAL DAILY
Qty: 90 TABLET | Refills: 4 | Status: SHIPPED | OUTPATIENT
Start: 2025-08-14

## 2025-08-14 RX ORDER — ESTRADIOL 0.1 MG/G
CREAM VAGINAL
Qty: 42.5 G | Refills: 5 | Status: SHIPPED | OUTPATIENT
Start: 2025-08-14

## 2025-08-30 ENCOUNTER — HOSPITAL ENCOUNTER (OUTPATIENT)
Dept: MAMMOGRAPHY | Age: 45
Discharge: HOME | End: 2025-09-02
Attending: OBSTETRICS & GYNECOLOGY

## 2025-08-30 VITALS — HEIGHT: 64 IN | BODY MASS INDEX: 27.66 KG/M2 | WEIGHT: 162 LBS

## 2025-08-30 DIAGNOSIS — Z12.31 OTHER SCREENING MAMMOGRAM: ICD-10-CM

## 2025-08-30 PROCEDURE — 77063 BREAST TOMOSYNTHESIS BI: CPT

## (undated) DEVICE — VISUALIZATION SYSTEM: Brand: CLEARIFY

## (undated) DEVICE — DRAPE TWL SURG 16X26IN BLU ORB04] ALLCARE INC]

## (undated) DEVICE — SUTURE V-LOC 180 SZ 0 L12IN ABSRB GRN L37MM GS-21 1/2 CIR VLOCL0316

## (undated) DEVICE — DRAPE,UNDERBUTTOCKS,PCH,STERILE: Brand: MEDLINE

## (undated) DEVICE — Z DUPLICATE USE 2847003 INJECTOR UTER

## (undated) DEVICE — SYR 10ML LUER LOK 1/5ML GRAD --

## (undated) DEVICE — LEGGINGS, PAIR, 31X48, STERILE: Brand: MEDLINE

## (undated) DEVICE — JELLY LUBRICATING 10GM PREFIL SYR LUBE

## (undated) DEVICE — CATHETER URETH 16FR PVC 2 W F

## (undated) DEVICE — DRAPE SHT 3 QTR PROXIMA 53X77 --

## (undated) DEVICE — LOGICUT SCISSOR LENGTH 320MM: Brand: LOGI - LAPAROSCOPIC INSTRUMENT SYSTEM

## (undated) DEVICE — AIRSEAL 8 MM ACCESS PORT AND LOW PROFILE OBTURATOR WITH BLADELESS OPTICAL TIP, 120 MM LENGTH: Brand: AIRSEAL

## (undated) DEVICE — Device

## (undated) DEVICE — BLADELESS OPTICAL TROCAR WITH FIXATION CANNULA: Brand: VERSAONE

## (undated) DEVICE — TRAY PREP DRY W/ PREM GLV 2 APPL 6 SPNG 2 UNDPD 1 OVERWRAP

## (undated) DEVICE — BAG DRNGE 4000ML CONT IRRIG ROUNDED TEARDROP SHP DISP

## (undated) DEVICE — TRI-LUMEN FILTERED TUBE SET WITH ACTIVATED CHARCOAL FILTER: Brand: AIRSEAL

## (undated) DEVICE — FORCEPS ES L45CM DIA5MM SERR HLLW JAW CRD CUT DISP PKS

## (undated) DEVICE — SUTURE VCRL SZ 3-0 L27IN ABSRB UD L26MM SH 1/2 CIR J416H

## (undated) DEVICE — CARDINAL HEALTH FLEXIBLE LIGHT HANDLE COVER: Brand: CARDINAL HEALTH

## (undated) DEVICE — KENDALL SCD EXPRESS SLEEVES, KNEE LENGTH, MEDIUM: Brand: KENDALL SCD

## (undated) DEVICE — DRAPE,TOP,102X53,STERILE: Brand: MEDLINE

## (undated) DEVICE — PK DISSECTING FORCEPS: Brand: ENDOWRIST;DAVINCI SI

## (undated) DEVICE — CONTROL SYRINGE LUER-LOCK TIP: Brand: MONOJECT

## (undated) DEVICE — PERI-PAD,MODERATE: Brand: CURITY

## (undated) DEVICE — MEGASUTURECUT ND: Brand: ENDOWRIST;DAVINCI SI

## (undated) DEVICE — LAP CHOLE: Brand: MEDLINE INDUSTRIES, INC.

## (undated) DEVICE — MONOPOLAR CURVED SCISSORS: Brand: ENDOWRIST

## (undated) DEVICE — FLOSEAL HEMOSTATIC MATRIX, 5 ML: Brand: FLOSEAL

## (undated) DEVICE — DERMABOND SKIN ADH 0.7ML -- DERMABOND ADVANCED 12/BX

## (undated) DEVICE — SEAL CANN F/12MM 8.5-13MM DISP -- DA VINCI

## (undated) DEVICE — CANNULA SEAL

## (undated) DEVICE — 40585 XL ADVANCED TRENDELENBURG POSITIONING KIT: Brand: 40585 XL ADVANCED TRENDELENBURG POSITIONING KIT

## (undated) DEVICE — 3M™ TEGADERM™ TRANSPARENT FILM DRESSING FRAME STYLE, 1624W, 2-3/8 IN X 2-3/4 IN (6 CM X 7 CM), 100/CT 4CT/CASE: Brand: 3M™ TEGADERM™

## (undated) DEVICE — CATHETER URETH 16FR BLLN 5CC SIL ALLY W/ SIL HYDRGEL 2 W F

## (undated) DEVICE — REM POLYHESIVE ADULT PATIENT RETURN ELECTRODE: Brand: VALLEYLAB

## (undated) DEVICE — GOWN,REINF,POLY,ECL,PP SLV,XL: Brand: MEDLINE

## (undated) DEVICE — AMD ANTIMICROBIAL GAUZE SPONGES 8 PLY USP TYPE VII: Brand: CURITY

## (undated) DEVICE — CONTAINER SPEC HISTOLOGY 900ML POLYPR

## (undated) DEVICE — DRAPE ROBOTIC CAM ARM DISP ENDOWRIST DA VINCI SI

## (undated) DEVICE — COVER,TABLE,44X76,STERILE: Brand: MEDLINE

## (undated) DEVICE — ELECTRO LUBE IS A SINGLE PATIENT USE DEVICE THAT IS INTENDED TO BE USED ON ELECTROSURGICAL ELECTRODES TO REDUCE STICKING.: Brand: KEY SURGICAL ELECTRO LUBE

## (undated) DEVICE — WARMER SCP STRL DISP

## (undated) DEVICE — BAG SPEC RETRV 275ML 10ML DISPOSABLE RELIACATCH

## (undated) DEVICE — SUTURE VCRL SZ 0 L27IN ABSRB VLT L26MM CT-2 1/2 CIR J334H

## (undated) DEVICE — DRAPE INSTR ARM ROBOTIC ENDOWRIST DA VINCI S

## (undated) DEVICE — TIP COVER ACCESSORY

## (undated) DEVICE — SOLUTION LACTATED RINGERS INJECTION USP

## (undated) DEVICE — AGENT HEMSTAT 5GM ARISTA AH

## (undated) DEVICE — 2000CC GUARDIAN II: Brand: GUARDIAN

## (undated) DEVICE — SUTURE VCRL SZ 0 L36IN ABSRB UD L36MM CT-1 1/2 CIR J946H

## (undated) DEVICE — SPONGE LAP 18X18IN STRL -- 5/PK

## (undated) DEVICE — SUTURE SZ 0 27IN 5/8 CIR UR-6  TAPER PT VIOLET ABSRB VICRYL J603H

## (undated) DEVICE — VCARE MEDIUM, UTERINE MANIPULATOR, VAGINAL-CERVICAL-AHLUWALIA'S-RETRACTOR-ELEVATOR: Brand: VCARE

## (undated) DEVICE — BUTTON SWITCH PENCIL BLADE ELECTRODE, HOLSTER: Brand: EDGE

## (undated) DEVICE — PK INSTRUMENT CORD, G400 GENERATOR: Brand: PK

## (undated) DEVICE — APPLICATOR FBR TIP L6IN COT TIP WOOD SHFT SWAB 2000 PER CA

## (undated) DEVICE — SUTURE VCRL SZ 3-0 L27IN ABSRB UD L26MM CT-2 1/2 CIR J232H

## (undated) DEVICE — SYR BULB 60ML IRRIGATION -- CONVERT TO ITEM 116413

## (undated) DEVICE — OBTRTR BLDELSS 8MM DISP -- DA VINCI - SNGL USE

## (undated) DEVICE — DRAPE ROBOTIC CAM HD DISP ENDOWRIST DA VINCI SI

## (undated) DEVICE — BLADELESS OPTICAL TROCAR WITH FIXATION CANNULA: Brand: VERSAPORT

## (undated) DEVICE — 2, DISPOSABLE SUCTION/IRRIGATOR WITHOUT DISPOSABLE TIP: Brand: STRYKEFLOW

## (undated) DEVICE — INSUFFLATION NEEDLE: Brand: SURGINEEDLE

## (undated) DEVICE — [HIGH FLOW INSUFFLATOR,  DO NOT USE IF PACKAGE IS DAMAGED,  KEEP DRY,  KEEP AWAY FROM SUNLIGHT,  PROTECT FROM HEAT AND RADIOACTIVE SOURCES.]: Brand: PNEUMOSURE

## (undated) DEVICE — APPLICATOR SEAL FLOSEAL 5MM+ --

## (undated) DEVICE — NEEDLE HYPO 25GA L1.5IN BLU POLYPR HUB S STL REG BVL STR

## (undated) DEVICE — SOLUTION IRRIG 1000ML H2O STRL BLT

## (undated) DEVICE — APPLICATOR BNDG 1MM ADH PREMIERPRO EXOFIN

## (undated) DEVICE — FILTER SMK EVAC FLO CLR MEGADYNE

## (undated) DEVICE — UNIVERSAL FIXATION CANNULA: Brand: VERSAONE

## (undated) DEVICE — (D)PREP SKN CHLRAPRP APPL 26ML -- CONVERT TO ITEM 371833

## (undated) DEVICE — FENESTRATED BIPOLAR FORCEPS: Brand: ENDOWRIST;DAVINCI SI

## (undated) DEVICE — SOL ANTI-FOG 6ML MEDC -- MEDICHOICE - CONVERT TO 358427

## (undated) DEVICE — SOLUTION IV 1000ML 0.9% SOD CHL